# Patient Record
Sex: MALE | Race: WHITE | Employment: OTHER | URBAN - METROPOLITAN AREA
[De-identification: names, ages, dates, MRNs, and addresses within clinical notes are randomized per-mention and may not be internally consistent; named-entity substitution may affect disease eponyms.]

---

## 2019-07-29 ENCOUNTER — OFFICE VISIT (OUTPATIENT)
Dept: FAMILY MEDICINE CLINIC | Age: 34
End: 2019-07-29

## 2019-08-12 ENCOUNTER — HOSPITAL ENCOUNTER (OUTPATIENT)
Dept: GENERAL RADIOLOGY | Age: 34
Discharge: HOME | End: 2019-08-12
Payer: COMMERCIAL

## 2019-08-12 ENCOUNTER — OFFICE VISIT (OUTPATIENT)
Dept: FAMILY MEDICINE CLINIC | Age: 34
End: 2019-08-12

## 2019-08-12 VITALS
HEIGHT: 73 IN | SYSTOLIC BLOOD PRESSURE: 164 MMHG | RESPIRATION RATE: 20 BRPM | HEART RATE: 117 BPM | WEIGHT: 163 LBS | DIASTOLIC BLOOD PRESSURE: 94 MMHG | TEMPERATURE: 97.6 F | BODY MASS INDEX: 21.6 KG/M2

## 2019-08-12 DIAGNOSIS — R35.0 FREQUENCY OF URINATION: ICD-10-CM

## 2019-08-12 DIAGNOSIS — F41.9 ANXIETY: ICD-10-CM

## 2019-08-12 DIAGNOSIS — R05.9 COUGH: ICD-10-CM

## 2019-08-12 DIAGNOSIS — R00.2 HEART PALPITATIONS: ICD-10-CM

## 2019-08-12 DIAGNOSIS — Z13.29 SCREENING FOR THYROID DISORDER: ICD-10-CM

## 2019-08-12 DIAGNOSIS — I10 HYPERTENSION GOAL BP (BLOOD PRESSURE) < 130/80: ICD-10-CM

## 2019-08-12 DIAGNOSIS — E78.00 HYPERCHOLESTEROLEMIA: ICD-10-CM

## 2019-08-12 DIAGNOSIS — E55.9 VITAMIN D DEFICIENCY: ICD-10-CM

## 2019-08-12 DIAGNOSIS — R73.03 BORDERLINE DIABETES MELLITUS: ICD-10-CM

## 2019-08-12 DIAGNOSIS — Z76.89 ESTABLISHING CARE WITH NEW DOCTOR, ENCOUNTER FOR: Primary | ICD-10-CM

## 2019-08-12 PROCEDURE — 71046 X-RAY EXAM CHEST 2 VIEWS: CPT

## 2019-08-12 RX ORDER — METOPROLOL TARTRATE 25 MG/1
25 TABLET, FILM COATED ORAL 2 TIMES DAILY
Qty: 60 TAB | Refills: 0 | Status: SHIPPED | OUTPATIENT
Start: 2019-08-12 | End: 2019-09-12 | Stop reason: SDUPTHER
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
## 2019-08-12 NOTE — PATIENT INSTRUCTIONS
Metoprolol (Lopressor, Toprol XL) - (By mouth) Why this medicine is used:  
Treats high blood pressure, chest pain, and heart failure. Contact a nurse or doctor right away if you have: · Lightheadedness, dizziness, fainting · Rapid weight gain; swelling in your hands, ankles, or feet Common side effects: · Mild dizziness · Tiredness © 2017 [address] Information is for End User's use only and may not be sold, redistributed or otherwise used for commercial purposes.

## 2019-08-13 LAB
25(OH)D3+25(OH)D2 SERPL-MCNC: 36.2 NG/ML (ref 30–100)
ALBUMIN SERPL-MCNC: 4.6 G/DL (ref 3.5–5.5)
ALBUMIN/GLOB SERPL: 1.8 {RATIO} (ref 1.2–2.2)
ALP SERPL-CCNC: 60 IU/L (ref 39–117)
ALT SERPL-CCNC: 18 IU/L (ref 0–44)
APPEARANCE UR: CLEAR
AST SERPL-CCNC: 14 IU/L (ref 0–40)
BASOPHILS # BLD AUTO: 0 X10E3/UL (ref 0–0.2)
BASOPHILS NFR BLD AUTO: 1 %
BILIRUB SERPL-MCNC: 0.2 MG/DL (ref 0–1.2)
BILIRUB UR QL STRIP: NEGATIVE
BUN SERPL-MCNC: 15 MG/DL (ref 6–20)
BUN/CREAT SERPL: 16 (ref 9–20)
CALCIUM SERPL-MCNC: 9.4 MG/DL (ref 8.7–10.2)
CHLORIDE SERPL-SCNC: 103 MMOL/L (ref 96–106)
CHOLEST SERPL-MCNC: 187 MG/DL (ref 100–199)
CO2 SERPL-SCNC: 25 MMOL/L (ref 20–29)
COLOR UR: YELLOW
CREAT SERPL-MCNC: 0.93 MG/DL (ref 0.76–1.27)
EOSINOPHIL # BLD AUTO: 0.2 X10E3/UL (ref 0–0.4)
EOSINOPHIL NFR BLD AUTO: 4 %
ERYTHROCYTE [DISTWIDTH] IN BLOOD BY AUTOMATED COUNT: 14.2 % (ref 12.3–15.4)
EST. AVERAGE GLUCOSE BLD GHB EST-MCNC: 103 MG/DL
GLOBULIN SER CALC-MCNC: 2.6 G/DL (ref 1.5–4.5)
GLUCOSE SERPL-MCNC: 88 MG/DL (ref 65–99)
GLUCOSE UR QL: NEGATIVE
HBA1C MFR BLD: 5.2 % (ref 4.8–5.6)
HCT VFR BLD AUTO: 41.9 % (ref 37.5–51)
HDLC SERPL-MCNC: 41 MG/DL
HGB BLD-MCNC: 13.9 G/DL (ref 13–17.7)
HGB UR QL STRIP: NEGATIVE
IMM GRANULOCYTES # BLD AUTO: 0 X10E3/UL (ref 0–0.1)
IMM GRANULOCYTES NFR BLD AUTO: 0 %
KETONES UR QL STRIP: NEGATIVE
LDLC SERPL CALC-MCNC: 134 MG/DL (ref 0–99)
LEUKOCYTE ESTERASE UR QL STRIP: NEGATIVE
LYMPHOCYTES # BLD AUTO: 1.8 X10E3/UL (ref 0.7–3.1)
LYMPHOCYTES NFR BLD AUTO: 30 %
MCH RBC QN AUTO: 31.2 PG (ref 26.6–33)
MCHC RBC AUTO-ENTMCNC: 33.2 G/DL (ref 31.5–35.7)
MCV RBC AUTO: 94 FL (ref 79–97)
MICRO URNS: NORMAL
MONOCYTES # BLD AUTO: 0.4 X10E3/UL (ref 0.1–0.9)
MONOCYTES NFR BLD AUTO: 7 %
NEUTROPHILS # BLD AUTO: 3.5 X10E3/UL (ref 1.4–7)
NEUTROPHILS NFR BLD AUTO: 58 %
NITRITE UR QL STRIP: NEGATIVE
PH UR STRIP: 6 [PH] (ref 5–7.5)
PLATELET # BLD AUTO: 254 X10E3/UL (ref 150–450)
POTASSIUM SERPL-SCNC: 4.6 MMOL/L (ref 3.5–5.2)
PROT SERPL-MCNC: 7.2 G/DL (ref 6–8.5)
PROT UR QL STRIP: NEGATIVE
RBC # BLD AUTO: 4.45 X10E6/UL (ref 4.14–5.8)
SODIUM SERPL-SCNC: 141 MMOL/L (ref 134–144)
SP GR UR: 1.01 (ref 1–1.03)
TRIGL SERPL-MCNC: 60 MG/DL (ref 0–149)
TSH SERPL DL<=0.005 MIU/L-ACNC: 3.76 UIU/ML (ref 0.45–4.5)
UROBILINOGEN UR STRIP-MCNC: 0.2 MG/DL (ref 0.2–1)
VLDLC SERPL CALC-MCNC: 12 MG/DL (ref 5–40)
WBC # BLD AUTO: 5.9 X10E3/UL (ref 3.4–10.8)

## 2019-08-16 ENCOUNTER — TELEPHONE (OUTPATIENT)
Dept: FAMILY MEDICINE CLINIC | Age: 34
End: 2019-08-16

## 2019-08-16 NOTE — TELEPHONE ENCOUNTER
----- Message from Jeb Zapata sent at 8/16/2019  9:44 AM EDT -----  Regarding: Dr. Taisha Mckeon first and last name: Patient       Reason for call: Pt is needing a cardiac holter monitor and still hasn't received it after being told he would be given a call to set up a date for arrival. Please follow up with the pt in reference to this matter. Pt states that it was electronically order per paperwork.      Callback required yes/no and why:   yes     Best contact number(s): [phone]      Details to clarify the request:      Jeb Zapata

## 2019-08-19 ENCOUNTER — OFFICE VISIT (OUTPATIENT)
Dept: FAMILY MEDICINE CLINIC | Age: 34
End: 2019-08-19

## 2019-08-19 VITALS
HEART RATE: 61 BPM | OXYGEN SATURATION: 99 % | TEMPERATURE: 98.2 F | BODY MASS INDEX: 21.74 KG/M2 | DIASTOLIC BLOOD PRESSURE: 66 MMHG | SYSTOLIC BLOOD PRESSURE: 122 MMHG | HEIGHT: 73 IN | RESPIRATION RATE: 17 BRPM | WEIGHT: 164 LBS

## 2019-08-19 DIAGNOSIS — I10 HYPERTENSION, WELL CONTROLLED: ICD-10-CM

## 2019-08-19 DIAGNOSIS — N17.9 ACUTE RENAL FAILURE, UNSPECIFIED ACUTE RENAL FAILURE TYPE (HCC): ICD-10-CM

## 2019-08-19 DIAGNOSIS — G47.01 INSOMNIA DUE TO MEDICAL CONDITION: Primary | ICD-10-CM

## 2019-08-19 RX ORDER — LORAZEPAM 0.5 MG/1
TABLET ORAL
Qty: 30 TAB | Refills: 0 | Status: SHIPPED | OUTPATIENT
Start: 2019-08-19 | End: 2019-09-24 | Stop reason: SDUPTHER

## 2019-08-19 NOTE — PATIENT INSTRUCTIONS
DASH Diet: Care Instructions  Your Care Instructions    The DASH diet is an eating plan that can help lower your blood pressure. DASH stands for Dietary Approaches to Stop Hypertension. Hypertension is high blood pressure. The DASH diet focuses on eating foods that are high in calcium, potassium, and magnesium. These nutrients can lower blood pressure. The foods that are highest in these nutrients are fruits, vegetables, low-fat dairy products, nuts, seeds, and legumes. But taking calcium, potassium, and magnesium supplements instead of eating foods that are high in those nutrients does not have the same effect. The DASH diet also includes whole grains, fish, and poultry. The DASH diet is one of several lifestyle changes your doctor may recommend to lower your high blood pressure. Your doctor may also want you to decrease the amount of sodium in your diet. Lowering sodium while following the DASH diet can lower blood pressure even further than just the DASH diet alone. Follow-up care is a key part of your treatment and safety. Be sure to make and go to all appointments, and call your doctor if you are having problems. It's also a good idea to know your test results and keep a list of the medicines you take. How can you care for yourself at home? Following the DASH diet  · Eat 4 to 5 servings of fruit each day. A serving is 1 medium-sized piece of fruit, ½ cup chopped or canned fruit, 1/4 cup dried fruit, or 4 ounces (½ cup) of fruit juice. Choose fruit more often than fruit juice. · Eat 4 to 5 servings of vegetables each day. A serving is 1 cup of lettuce or raw leafy vegetables, ½ cup of chopped or cooked vegetables, or 4 ounces (½ cup) of vegetable juice. Choose vegetables more often than vegetable juice. · Get 2 to 3 servings of low-fat and fat-free dairy each day. A serving is 8 ounces of milk, 1 cup of yogurt, or 1 ½ ounces of cheese. · Eat 6 to 8 servings of grains each day.  A serving is 1 slice of bread, 1 ounce of dry cereal, or ½ cup of cooked rice, pasta, or cooked cereal. Try to choose whole-grain products as much as possible. · Limit lean meat, poultry, and fish to 2 servings each day. A serving is 3 ounces, about the size of a deck of cards. · Eat 4 to 5 servings of nuts, seeds, and legumes (cooked dried beans, lentils, and split peas) each week. A serving is 1/3 cup of nuts, 2 tablespoons of seeds, or ½ cup of cooked beans or peas. · Limit fats and oils to 2 to 3 servings each day. A serving is 1 teaspoon of vegetable oil or 2 tablespoons of salad dressing. · Limit sweets and added sugars to 5 servings or less a week. A serving is 1 tablespoon jelly or jam, ½ cup sorbet, or 1 cup of lemonade. · Eat less than 2,300 milligrams (mg) of sodium a day. If you limit your sodium to 1,500 mg a day, you can lower your blood pressure even more. Tips for success  · Start small. Do not try to make dramatic changes to your diet all at once. You might feel that you are missing out on your favorite foods and then be more likely to not follow the plan. Make small changes, and stick with them. Once those changes become habit, add a few more changes. · Try some of the following:  ? Make it a goal to eat a fruit or vegetable at every meal and at snacks. This will make it easy to get the recommended amount of fruits and vegetables each day. ? Try yogurt topped with fruit and nuts for a snack or healthy dessert. ? Add lettuce, tomato, cucumber, and onion to sandwiches. ? Combine a ready-made pizza crust with low-fat mozzarella cheese and lots of vegetable toppings. Try using tomatoes, squash, spinach, broccoli, carrots, cauliflower, and onions. ? Have a variety of cut-up vegetables with a low-fat dip as an appetizer instead of chips and dip. ? Sprinkle sunflower seeds or chopped almonds over salads. Or try adding chopped walnuts or almonds to cooked vegetables.   ? Try some vegetarian meals using beans and peas. Add garbanzo or kidney beans to salads. Make burritos and tacos with mashed ríos beans or black beans. Where can you learn more? Go to http://geoff-jen.info/. Enter U081 in the search box to learn more about \"DASH Diet: Care Instructions. \"  Current as of: July 22, 2018  Content Version: 12.1  © 5261-4567 [employer], [employer]. Care instructions adapted under license by [employer] (which disclaims liability or warranty for this information). If you have questions about a medical condition or this instruction, always ask your healthcare professional. [address] any warranty or liability for your use of this information.

## 2019-08-19 NOTE — PROGRESS NOTES
Call  regarding order for the cardiac monitor. Patient want to cancel reason have a scheduled appt with the cardio on 8/27/2019.

## 2019-08-19 NOTE — PROGRESS NOTES
Chief Complaint   Patient presents with    Labs     1 week follow up     HPI:  James Dickerson is a 28 y.o.  male with h/o episodic palpitation, hypertension presents for 1 week f/u on results . Patient was started on metoprolol a week ago. Today he says he feel much better; blood pressure is controlled, heart rate is within normal limits. He does have appointment with his cardiologist at [address] [place] [address] on 8/27/2019 so he declined use of heart monitor and echo. Concern today is difficulty falling asleep. He is requesting a sleep aid. Review of Systems  As per hpi    Past Medical History:   Diagnosis Date    Asthma     Chronic kidney disease     left kidny issiue    Hypercholesterolemia     Hypertension      Past Surgical History:   Procedure Laterality Date    ABDOMEN SURGERY PROC UNLISTED      exploratory    HX ORTHOPAEDIC      left hand    VASCULAR SURGERY PROCEDURE UNLIST      left leg     Social History     Socioeconomic History    Marital status: SINGLE     Spouse name: Not on file    Number of children: Not on file    Years of education: Not on file    Highest education level: Not on file   Tobacco Use    Smoking status: Current Every Day Smoker     Packs/day: 1.00    Smokeless tobacco: Never Used   Substance and Sexual Activity    Alcohol use: No    Drug use: Not Currently     Types: Marijuana    Sexual activity: Yes     Partners: Female     History reviewed. No pertinent family history. Current Outpatient Medications   Medication Sig Dispense Refill    metoprolol tartrate (LOPRESSOR) 25 mg tablet Take 1 Tab by mouth two (2) times a day.  60 Tab 0     Allergies   Allergen Reactions    Bactrim [Sulfamethoprim Ds] Rash    Nsaids (Non-Steroidal Anti-Inflammatory Drug) Other (comments)     Pt states he is not supposed to have due to kidney injury       Objective:  Visit Vitals  /66 (BP 1 Location: Left arm, BP Patient Position: Sitting)   Pulse 61   Temp 98.2 °F (36.8 °C) (Oral)   Resp 17   Ht 6' 1\" (1.854 m)   Wt 164 lb (74.4 kg)   SpO2 99%   BMI 21.64 kg/m²     Physical Exam:   General appearance - alert, well appearing in no distress  Mental status - alert, oriented to person, place, and time  Neck - supple, no significant adenopathy   Chest - clear to auscultation, no wheezes, rales or rhonchi  Heart - normal rate, regular rhythm, normal blood pressure  Abdomen - soft, nontender, nondistended, no organomegaly  Ext-peripheral pulses normal, no pedal edema  Neuro -alert, oriented, normal speech, no focal findings   Back-full range of motion, no tenderness, palpable spasm or pain on motion     Results for orders placed or performed in visit on 58/27/11   METABOLIC PANEL, COMPREHENSIVE   Result Value Ref Range    Glucose 88 65 - 99 mg/dL    BUN 15 6 - 20 mg/dL    Creatinine 0.93 0.76 - 1.27 mg/dL    GFR est non- >59 mL/min/1.73    GFR est  >59 mL/min/1.73    BUN/Creatinine ratio 16 9 - 20    Sodium 141 134 - 144 mmol/L    Potassium 4.6 3.5 - 5.2 mmol/L    Chloride 103 96 - 106 mmol/L    CO2 25 20 - 29 mmol/L    Calcium 9.4 8.7 - 10.2 mg/dL    Protein, total 7.2 6.0 - 8.5 g/dL    Albumin 4.6 3.5 - 5.5 g/dL    GLOBULIN, TOTAL 2.6 1.5 - 4.5 g/dL    A-G Ratio 1.8 1.2 - 2.2    Bilirubin, total 0.2 0.0 - 1.2 mg/dL    Alk. phosphatase 60 39 - 117 IU/L    AST (SGOT) 14 0 - 40 IU/L    ALT (SGPT) 18 0 - 44 IU/L   CBC WITH AUTOMATED DIFF   Result Value Ref Range    WBC 5.9 3.4 - 10.8 x10E3/uL    RBC 4.45 4.14 - 5.80 x10E6/uL    HGB 13.9 13.0 - 17.7 g/dL    HCT 41.9 37.5 - 51.0 %    MCV 94 79 - 97 fL    MCH 31.2 26.6 - 33.0 pg    MCHC 33.2 31.5 - 35.7 g/dL    RDW 14.2 12.3 - 15.4 %    PLATELET 564 209 - 572 x10E3/uL    NEUTROPHILS 58 Not Estab. %    Lymphocytes 30 Not Estab. %    MONOCYTES 7 Not Estab. %    EOSINOPHILS 4 Not Estab. %    BASOPHILS 1 Not Estab. %    ABS. NEUTROPHILS 3.5 1.4 - 7.0 x10E3/uL    Abs Lymphocytes 1.8 0.7 - 3.1 x10E3/uL    ABS. MONOCYTES 0.4 0.1 - 0.9 x10E3/uL    ABS. EOSINOPHILS 0.2 0.0 - 0.4 x10E3/uL    ABS. BASOPHILS 0.0 0.0 - 0.2 x10E3/uL    IMMATURE GRANULOCYTES 0 Not Estab. %    ABS. IMM. GRANS. 0.0 0.0 - 0.1 x10E3/uL   LIPID PANEL   Result Value Ref Range    Cholesterol, total 187 100 - 199 mg/dL    Triglyceride 60 0 - 149 mg/dL    HDL Cholesterol 41 >39 mg/dL    VLDL, calculated 12 5 - 40 mg/dL    LDL, calculated 134 (H) 0 - 99 mg/dL   HEMOGLOBIN A1C WITH EAG   Result Value Ref Range    Hemoglobin A1c 5.2 4.8 - 5.6 %    Estimated average glucose 103 mg/dL   TSH 3RD GENERATION   Result Value Ref Range    TSH 3.760 0.450 - 4.500 uIU/mL   VITAMIN D, 25 HYDROXY   Result Value Ref Range    VITAMIN D, 25-HYDROXY 36.2 30.0 - 100.0 ng/mL   URINALYSIS W/ RFLX MICROSCOPIC   Result Value Ref Range    Specific Gravity 1.015 1.005 - 1.030    pH (UA) 6.0 5.0 - 7.5    Color Yellow Yellow    Appearance Clear Clear    Leukocyte Esterase Negative Negative    Protein Negative Negative/Trace    Glucose Negative Negative    Ketone Negative Negative    Blood Negative Negative    Bilirubin Negative Negative    Urobilinogen 0.2 0.2 - 1.0 mg/dL    Nitrites Negative Negative    Microscopic Examination Comment      Assessment/Plan:  Diagnoses and all orders for this visit:    Insomnia due to medical condition  -     LORazepam (ATIVAN) 0.5 mg tablet; Take 1 tab nightly, Print, Disp-30 Tab, R-0    Acute renal failure, unspecified acute renal failure type ([address]  -      RETROPERITONEUM LTD; Future    Hypertension, well controlled      Patient Instructions        DASH Diet: Care Instructions  Your Care Instructions    The DASH diet is an eating plan that can help lower your blood pressure. DASH stands for Dietary Approaches to Stop Hypertension. Hypertension is high blood pressure. The DASH diet focuses on eating foods that are high in calcium, potassium, and magnesium. These nutrients can lower blood pressure.  The foods that are highest in these nutrients are fruits, vegetables, low-fat dairy products, nuts, seeds, and legumes. But taking calcium, potassium, and magnesium supplements instead of eating foods that are high in those nutrients does not have the same effect. The DASH diet also includes whole grains, fish, and poultry. The DASH diet is one of several lifestyle changes your doctor may recommend to lower your high blood pressure. Your doctor may also want you to decrease the amount of sodium in your diet. Lowering sodium while following the DASH diet can lower blood pressure even further than just the DASH diet alone. Follow-up care is a key part of your treatment and safety. Be sure to make and go to all appointments, and call your doctor if you are having problems. It's also a good idea to know your test results and keep a list of the medicines you take. How can you care for yourself at home? Following the DASH diet  · Eat 4 to 5 servings of fruit each day. A serving is 1 medium-sized piece of fruit, ½ cup chopped or canned fruit, 1/4 cup dried fruit, or 4 ounces (½ cup) of fruit juice. Choose fruit more often than fruit juice. · Eat 4 to 5 servings of vegetables each day. A serving is 1 cup of lettuce or raw leafy vegetables, ½ cup of chopped or cooked vegetables, or 4 ounces (½ cup) of vegetable juice. Choose vegetables more often than vegetable juice. · Get 2 to 3 servings of low-fat and fat-free dairy each day. A serving is 8 ounces of milk, 1 cup of yogurt, or 1 ½ ounces of cheese. · Eat 6 to 8 servings of grains each day. A serving is 1 slice of bread, 1 ounce of dry cereal, or ½ cup of cooked rice, pasta, or cooked cereal. Try to choose whole-grain products as much as possible. · Limit lean meat, poultry, and fish to 2 servings each day. A serving is 3 ounces, about the size of a deck of cards. · Eat 4 to 5 servings of nuts, seeds, and legumes (cooked dried beans, lentils, and split peas) each week.  A serving is 1/3 cup of nuts, 2 tablespoons of seeds, or ½ cup of cooked beans or peas. · Limit fats and oils to 2 to 3 servings each day. A serving is 1 teaspoon of vegetable oil or 2 tablespoons of salad dressing. · Limit sweets and added sugars to 5 servings or less a week. A serving is 1 tablespoon jelly or jam, ½ cup sorbet, or 1 cup of lemonade. · Eat less than 2,300 milligrams (mg) of sodium a day. If you limit your sodium to 1,500 mg a day, you can lower your blood pressure even more. Tips for success  · Start small. Do not try to make dramatic changes to your diet all at once. You might feel that you are missing out on your favorite foods and then be more likely to not follow the plan. Make small changes, and stick with them. Once those changes become habit, add a few more changes. · Try some of the following:  ? Make it a goal to eat a fruit or vegetable at every meal and at snacks. This will make it easy to get the recommended amount of fruits and vegetables each day. ? Try yogurt topped with fruit and nuts for a snack or healthy dessert. ? Add lettuce, tomato, cucumber, and onion to sandwiches. ? Combine a ready-made pizza crust with low-fat mozzarella cheese and lots of vegetable toppings. Try using tomatoes, squash, spinach, broccoli, carrots, cauliflower, and onions. ? Have a variety of cut-up vegetables with a low-fat dip as an appetizer instead of chips and dip. ? Sprinkle sunflower seeds or chopped almonds over salads. Or try adding chopped walnuts or almonds to cooked vegetables. ? Try some vegetarian meals using beans and peas. Add garbanzo or kidney beans to salads. Make burritos and tacos with mashed ríos beans or black beans. Where can you learn more? Go to http://geoff-jen.info/. Enter W606 in the search box to learn more about \"DASH Diet: Care Instructions. \"  Current as of: July 22, 2018  Content Version: 12.1  © 0721-9198 [employer].  Care instructions adapted under license by [employer] (which disclaims liability or warranty for this information). If you have questions about a medical condition or this instruction, always ask your healthcare professional. [place] [address] any warranty or liability for your use of this information. Follow-up and Dispositions    · Return in about 3 months (around 11/19/2019), or if symptoms worsen or fail to improve, for routine follow up.

## 2019-08-19 NOTE — PROGRESS NOTES
Chief Complaint   Patient presents with    Labs     1 week follow up     1. Have you been to the ER, urgent care clinic since your last visit? No   Hospitalized since your last visit? No     2. Have you seen or consulted any other health care providers outside of the [address] [place] [address] since your last visit?    No

## 2019-08-26 ENCOUNTER — HOSPITAL ENCOUNTER (OUTPATIENT)
Dept: ULTRASOUND IMAGING | Age: 34
Discharge: HOME | End: 2019-08-26
Attending: INTERNAL MEDICINE
Payer: COMMERCIAL

## 2019-08-26 DIAGNOSIS — N17.9 ACUTE RENAL FAILURE, UNSPECIFIED ACUTE RENAL FAILURE TYPE (HCC): ICD-10-CM

## 2019-08-26 PROCEDURE — 76775 US EXAM ABDO BACK WALL LIM: CPT

## 2019-09-09 NOTE — TELEPHONE ENCOUNTER
----- Message from Kyle Nguyen sent at 9/6/2019  5:16 PM EDT -----  Regarding: /Refill   Medication Refill    Caller (if not patient):Monique adam       Relationship of caller (if not patient):spouse      Best contact number(s):[phone]      Name of medication and dosage if known:Metroprolol 25mg       Is patient out of this medication (yes/no):  No- 4 days remaining       Pharmacy name:[facility]     Pharmacy listed in chart? (yes/no):Yes  Pharmacy phone number: [phone]      Details to clarify the request:      Kyle Nguyen

## 2019-09-11 ENCOUNTER — TELEPHONE (OUTPATIENT)
Dept: FAMILY MEDICINE CLINIC | Age: 34
End: 2019-09-11

## 2019-09-11 NOTE — TELEPHONE ENCOUNTER
Pt is checking on refill request for: metoprolol tartrate (LOPRESSOR) 25 mg tablet   He runs out today     Pending in [facility]       Best number to reach him is [phone]

## 2019-09-12 RX ORDER — METOPROLOL TARTRATE 25 MG/1
25 TABLET, FILM COATED ORAL 2 TIMES DAILY
Qty: 180 TAB | Refills: 1 | Status: SHIPPED | OUTPATIENT
Start: 2019-09-12 | End: 2020-08-12 | Stop reason: SDUPTHER

## 2019-09-14 RX ORDER — METOPROLOL TARTRATE 25 MG/1
25 TABLET, FILM COATED ORAL 2 TIMES DAILY
Qty: 60 TAB | Refills: 3 | Status: SHIPPED | OUTPATIENT
Start: 2019-09-14 | End: 2020-08-12

## 2019-09-24 DIAGNOSIS — G47.01 INSOMNIA DUE TO MEDICAL CONDITION: ICD-10-CM

## 2019-09-24 NOTE — TELEPHONE ENCOUNTER
----- Message from Anjali Waldron sent at 9/24/2019 12:39 PM EDT -----  Regarding: /telephone  Medication Refill    Caller (if not patient): Charley Barksdale       Relationship of caller (if not patient): spouse      Best contact number(s): [phone]      Name of medication and dosage if known: ativan 0.5 mg       Is patient out of this medication (yes/no): yes      Pharm[facility] name: [facility] listed in chart? (yes/no): yes  Pharmacy phone number: [phone]      Details to clarify the request:      Anjali Waldron

## 2019-09-25 RX ORDER — LORAZEPAM 0.5 MG/1
TABLET ORAL
Qty: 30 TAB | Refills: 0 | Status: SHIPPED | OUTPATIENT
Start: 2019-09-25 | End: 2019-10-24 | Stop reason: SDUPTHER

## 2019-10-24 DIAGNOSIS — G47.01 INSOMNIA DUE TO MEDICAL CONDITION: ICD-10-CM

## 2019-10-25 ENCOUNTER — DOCUMENTATION ONLY (OUTPATIENT)
Dept: FAMILY MEDICINE CLINIC | Age: 34
End: 2019-10-25

## 2019-10-25 RX ORDER — LORAZEPAM 0.5 MG/1
TABLET ORAL
Qty: 30 TAB | Refills: 0 | Status: SHIPPED | OUTPATIENT
Start: 2019-10-25 | End: 2019-12-11 | Stop reason: SDUPTHER

## 2019-12-11 DIAGNOSIS — G47.01 INSOMNIA DUE TO MEDICAL CONDITION: ICD-10-CM

## 2019-12-11 RX ORDER — LORAZEPAM 0.5 MG/1
TABLET ORAL
Qty: 30 TAB | Refills: 0 | Status: SHIPPED | OUTPATIENT
Start: 2019-12-11 | End: 2020-01-20

## 2020-01-18 DIAGNOSIS — G47.01 INSOMNIA DUE TO MEDICAL CONDITION: ICD-10-CM

## 2020-01-20 RX ORDER — LORAZEPAM 0.5 MG/1
TABLET ORAL
Qty: 30 TAB | Refills: 0 | Status: SHIPPED | OUTPATIENT
Start: 2020-01-20 | End: 2020-03-21

## 2020-03-20 DIAGNOSIS — G47.01 INSOMNIA DUE TO MEDICAL CONDITION: ICD-10-CM

## 2020-03-21 RX ORDER — LORAZEPAM 0.5 MG/1
TABLET ORAL
Qty: 30 TAB | Refills: 0 | Status: SHIPPED | OUTPATIENT
Start: 2020-03-21 | End: 2020-09-24 | Stop reason: ALTCHOICE

## 2020-09-24 ENCOUNTER — VIRTUAL VISIT (OUTPATIENT)
Dept: FAMILY MEDICINE CLINIC | Age: 35
End: 2020-09-24
Payer: COMMERCIAL

## 2020-09-24 DIAGNOSIS — F41.9 ANXIETY: ICD-10-CM

## 2020-09-24 DIAGNOSIS — G47.01 INSOMNIA DUE TO MEDICAL CONDITION: ICD-10-CM

## 2020-09-24 DIAGNOSIS — Z13.29 SCREENING FOR THYROID DISORDER: ICD-10-CM

## 2020-09-24 DIAGNOSIS — E55.9 VITAMIN D DEFICIENCY: ICD-10-CM

## 2020-09-24 DIAGNOSIS — I10 HYPERTENSION, WELL CONTROLLED: Primary | ICD-10-CM

## 2020-09-24 DIAGNOSIS — R73.03 BORDERLINE DIABETES MELLITUS: ICD-10-CM

## 2020-09-24 DIAGNOSIS — E78.00 HYPERCHOLESTEROLEMIA: ICD-10-CM

## 2020-09-24 DIAGNOSIS — R35.0 FREQUENCY OF URINATION: ICD-10-CM

## 2020-09-24 PROCEDURE — 99214 OFFICE O/P EST MOD 30 MIN: CPT | Performed by: INTERNAL MEDICINE

## 2020-09-24 RX ORDER — METOPROLOL TARTRATE 25 MG/1
TABLET, FILM COATED ORAL
Qty: 60 TAB | Refills: 3 | Status: SHIPPED | OUTPATIENT
Start: 2020-09-24 | End: 2021-02-08

## 2020-09-24 RX ORDER — ESCITALOPRAM OXALATE 5 MG/1
5 TABLET ORAL DAILY
Qty: 30 TAB | Refills: 2 | Status: SHIPPED | OUTPATIENT
Start: 2020-09-24 | End: 2021-12-01

## 2020-09-24 RX ORDER — LORAZEPAM 0.5 MG/1
TABLET ORAL
Qty: 30 TAB | Refills: 0 | Status: SHIPPED | OUTPATIENT
Start: 2020-09-24 | End: 2020-11-20

## 2020-09-24 NOTE — TELEPHONE ENCOUNTER
Pt states he does not want to start Lexapro     He wants to stay on current med Ativan - he will need a refill       Best number to reach him is [phone]

## 2020-11-19 DIAGNOSIS — G47.01 INSOMNIA DUE TO MEDICAL CONDITION: ICD-10-CM

## 2020-11-20 RX ORDER — LORAZEPAM 0.5 MG/1
TABLET ORAL
Qty: 30 TAB | Refills: 0 | Status: SHIPPED | OUTPATIENT
Start: 2020-11-20 | End: 2021-02-04 | Stop reason: SDUPTHER

## 2021-01-31 DIAGNOSIS — I10 HYPERTENSION, WELL CONTROLLED: ICD-10-CM

## 2021-02-04 DIAGNOSIS — G47.01 INSOMNIA DUE TO MEDICAL CONDITION: ICD-10-CM

## 2021-02-04 DIAGNOSIS — I10 HYPERTENSION, WELL CONTROLLED: ICD-10-CM

## 2021-02-04 RX ORDER — METOPROLOL TARTRATE 25 MG/1
TABLET, FILM COATED ORAL
Qty: 60 TAB | Refills: 3 | Status: CANCELLED | OUTPATIENT
Start: 2021-02-04

## 2021-02-08 RX ORDER — METOPROLOL TARTRATE 25 MG/1
TABLET, FILM COATED ORAL
Qty: 60 TAB | Refills: 3 | Status: SHIPPED | OUTPATIENT
Start: 2021-02-08 | End: 2021-07-05

## 2021-02-19 RX ORDER — LORAZEPAM 0.5 MG/1
TABLET ORAL
Qty: 30 TAB | Refills: 0 | Status: SHIPPED | OUTPATIENT
Start: 2021-02-19 | End: 2021-03-15

## 2021-03-15 DIAGNOSIS — G47.01 INSOMNIA DUE TO MEDICAL CONDITION: ICD-10-CM

## 2021-03-15 RX ORDER — LORAZEPAM 0.5 MG/1
TABLET ORAL
Qty: 30 TAB | Refills: 0 | Status: SHIPPED | OUTPATIENT
Start: 2021-03-15 | End: 2021-04-20 | Stop reason: SDUPTHER

## 2021-04-19 ENCOUNTER — TRANSCRIBE ORDER (OUTPATIENT)
Dept: INTERNAL MEDICINE CLINIC | Age: 36
End: 2021-04-19

## 2021-04-19 DIAGNOSIS — G47.01 INSOMNIA DUE TO MEDICAL CONDITION: ICD-10-CM

## 2021-04-19 NOTE — TELEPHONE ENCOUNTER
----- Message from Syeda Wayne sent at 4/19/2021  1:09 PM EDT -----  Regarding: Tash Bowers MD  Medication Refill    Caller (if not patient):      Relationship of caller (if not patient):      Best contact number(s): [phone]      Name of medication and dosage if known: LORazepam (ATIVAN) 0.5 mg tablet      Is patient out of this medication (yes/no): Yes      Pharmacy name: [facility] [place], [place] - [address]  [employer] [facility] Chris listed in chart? (yes/no):  Pharmacy phone number:      Details to clarify the request: pt is requesting temp fill until 04/23/2021 appt      Syeda Wayne

## 2021-04-21 RX ORDER — LORAZEPAM 0.5 MG/1
0.5 TABLET ORAL
Qty: 30 TAB | Refills: 0 | Status: SHIPPED | OUTPATIENT
Start: 2021-04-21 | End: 2021-04-23 | Stop reason: SDUPTHER

## 2021-04-23 ENCOUNTER — VIRTUAL VISIT (OUTPATIENT)
Dept: FAMILY MEDICINE CLINIC | Age: 36
End: 2021-04-23
Payer: COMMERCIAL

## 2021-04-23 DIAGNOSIS — F41.9 ANXIETY: ICD-10-CM

## 2021-04-23 DIAGNOSIS — G47.01 INSOMNIA DUE TO MEDICAL CONDITION: ICD-10-CM

## 2021-04-23 DIAGNOSIS — Z11.59 NEED FOR HEPATITIS C SCREENING TEST: Primary | ICD-10-CM

## 2021-04-23 PROCEDURE — 99214 OFFICE O/P EST MOD 30 MIN: CPT | Performed by: INTERNAL MEDICINE

## 2021-04-23 RX ORDER — LORAZEPAM 0.5 MG/1
TABLET ORAL
Qty: 30 TAB | Refills: 0 | Status: SHIPPED | OUTPATIENT
Start: 2021-04-23 | End: 2021-10-29

## 2021-04-23 NOTE — PROGRESS NOTES
Chief Complaint   Patient presents with    Medication Refill    Follow-up     HPI:  Gio Price is a 38 y.o. male who was seen by synchronous (real-time) audio-video technology on 4/23/2021 for Medication Refill and Follow-up    Assessment & Plan:   Diagnoses and all orders for this visit:    1. Need for hepatitis C screening test  -     HEPATITIS C AB; Future    2. Anxiety  -     LORazepam (ATIVAN) 0.5 mg tablet; Take 1 tab by mouth every night    3. Insomnia due to medical condition  -     LORazepam (ATIVAN) 0.5 mg tablet; Take 1 tab by mouth every night      I spent at least 20 minutes on this visit with this established patient. 712  Subjective:   Gio Price is a 38 y.o. AA male with h/o anxiety, episodic palpitation, hypertension, hypercholesterolemia, asthma is seen for follow up. Patient is here for medication refill. Since pat was seen September/2020, labs oreded have not been done. I have informed him that this will be my last giving him refill until blood work is done. Prior to Admission medications    Medication Sig Start Date End Date Taking? Authorizing Provider   LORazepam (ATIVAN) 0.5 mg tablet Take 1 Tab by mouth nightly as needed for Anxiety. Max Daily Amount: 0.5 mg. TAKE 1 TABLET BY MOUTH EVERY DAY EVERY NIGHTTAKE 1 TABLET BY MOUTH EVERY DAY EVERY NIGHT 4/21/21  Yes Ann العراقي MD   metoprolol tartrate (LOPRESSOR) 25 mg tablet TAKE 1 TABLET BY MOUTH TWICE A DAY 2/8/21  Yes Maegan SHIPLEY MD   escitalopram oxalate (LEXAPRO) 5 mg tablet Take 1 Tab by mouth daily. 9/24/20   Ann العراقي MD     There is no problem list on file for this patient. Current Outpatient Medications   Medication Sig Dispense Refill    LORazepam (ATIVAN) 0.5 mg tablet Take 1 Tab by mouth nightly as needed for Anxiety.  Max Daily Amount: 0.5 mg. TAKE 1 TABLET BY MOUTH EVERY DAY EVERY NIGHTTAKE 1 TABLET BY MOUTH EVERY DAY EVERY NIGHT 30 Tab 0    metoprolol tartrate (LOPRESSOR) 25 mg tablet TAKE 1 TABLET BY MOUTH TWICE A DAY 60 Tab 3    escitalopram oxalate (LEXAPRO) 5 mg tablet Take 1 Tab by mouth daily. 30 Tab 2     Allergies   Allergen Reactions    Bactrim [Sulfamethoprim Ds] Rash    Nsaids (Non-Steroidal Anti-Inflammatory Drug) Other (comments)     Pt states he is not supposed to have due to kidney injury     Past Medical History:   Diagnosis Date    Asthma     Chronic kidney disease     left connie aguilariaddy    Hypercholesterolemia     Hypertension      Past Surgical History:   Procedure Laterality Date    HX ORTHOPAEDIC      left hand    [place] ABDOMEN SURGERY PROC UNLISTED      exploratory    VASCULAR SURGERY PROCEDURE UNLIST      left leg     No family history on file. Social History     Tobacco Use    Smoking status: Current Every Day Smoker     Packs/day: 1.00    Smokeless tobacco: Never Used   Substance Use Topics    Alcohol use: No     ROS:  As per hpi    Objective:     Patient-Reported Vitals 4/21/2021   Patient-Reported Weight 180   Patient-Reported Height 6'1   Patient-Reported Pulse 61   Patient-Reported Temperature 98.4   Patient-Reported Systolic  523   Patient-Reported Diastolic 83      General: alert, cooperative, no distress   Mental  status: normal mood, behavior, speech, dress, motor activity, and thought processes, able to follow commands   HENT: NCAT   Neck: no visualized mass   Resp: no respiratory distress   Neuro: no gross deficits   Skin: no discoloration or lesions of concern on visible areas   Psychiatric: normal affect, consistent with stated mood, no evidence of hallucinations       Additional exam findings: We discussed the expected course, resolution and complications of the diagnosis(es) in detail. Medication risks, benefits, costs, interactions, and alternatives were discussed as indicated. I advised him to contact the office if his condition worsens, changes or fails to improve as anticipated. He expressed understanding with the diagnosis(es) and plan.      José Miguel Bennett, was evaluated through a synchronous (real-time) audio-video encounter. The patient (or guardian if applicable) is aware that this is a billable service. Verbal consent to proceed has been obtained within the past 12 months. The visit was conducted pursuant to the emergency declaration under the [address] [place] [address] authority and the [place] [employer] Act. Patient identification was verified, and a caregiver was present when appropriate. The patient was located in a state where the provider was credentialed to provide care.     Paulina Cabrales MD

## 2021-07-03 DIAGNOSIS — I10 HYPERTENSION, WELL CONTROLLED: ICD-10-CM

## 2021-07-05 RX ORDER — METOPROLOL TARTRATE 25 MG/1
TABLET, FILM COATED ORAL
Qty: 60 TABLET | Refills: 3 | Status: SHIPPED | OUTPATIENT
Start: 2021-07-05 | End: 2022-08-10 | Stop reason: SDUPTHER

## 2021-09-11 ENCOUNTER — HOSPITAL ENCOUNTER (EMERGENCY)
Age: 36
Discharge: HOME | End: 2021-09-11
Attending: STUDENT IN AN ORGANIZED HEALTH CARE EDUCATION/TRAINING PROGRAM
Payer: COMMERCIAL

## 2021-09-11 VITALS
RESPIRATION RATE: 16 BRPM | DIASTOLIC BLOOD PRESSURE: 99 MMHG | HEART RATE: 68 BPM | OXYGEN SATURATION: 96 % | SYSTOLIC BLOOD PRESSURE: 164 MMHG | TEMPERATURE: 98.7 F

## 2021-09-11 DIAGNOSIS — F41.1 ANXIETY STATE: Primary | ICD-10-CM

## 2021-09-11 PROCEDURE — 90791 PSYCH DIAGNOSTIC EVALUATION: CPT

## 2021-09-11 PROCEDURE — 99283 EMERGENCY DEPT VISIT LOW MDM: CPT

## 2021-09-11 NOTE — BSMART NOTE
Comprehensive Assessment Form Part 1      Section I - Disposition    Generalized Anxiety Disorder       The Medical Doctor to Psychiatrist conference was not completed. The Medical Doctor is in agreement with Psychiatrist disposition because of (reason) n/a. The plan is discharge patient home with referrals. The on-call Psychiatrist consulted was Dr. Lisabeth Gilford. The admitting Psychiatrist will be Dr. Lisabeth Gilford. The admitting Diagnosis is n/a. The Payor source is [address] [place] [address]    Section II - Integrated Summary  Summary: Per triage, \"Patient arrives ambulatory from home. Patient was brought in by [employer]. Patient states that he has been having trouble sleeping and was [place] a nervous breakdown tonight. \" Denies SI/HI\"     Patient is a 38year old male that arrived voluntary to the emergency room accompanied by [employer]. He was seen face to face by [facility]. Pt reported trouble sleeping and having a \"nervous breakdown. \" This writer attempted to ask patient the precipitating factors and he had a hard time identifying them. He reported \"stuff\" and \"life\" as triggers. He shared his insomnia began after the pandemic started, and pt has been dealing with work stressors and recently had a baby and this has been stressful. He described his breakdown as feeling \"anxious\" and \"sketchy. \" This writer asked pt to elaborate what he meant, and pt said he starts to \"question\" things and be skeptical. Denied SI/HI/AH/VH and reported no history of suicidal attempts or inpatient admissions. Pt does have a provider prescribing him medication, Dr. Rubén Stephens, and he takes Lorezapam for anxiety and insomnia. Pt reported his appetite to be \"so,so. \" He stated he recently began isolating and being more introverted. Pt lives with wife and 3year old baby.  Per pt, wife was concerned about patient's sleep and encouraged him to come to the hospital. This writer asked if she can call patient's wife and pt did not give consent. He currently has a misdemeanor and has a court date coming up. Patient appeared to be distracted during the assessment and demonstrated thought blocking. This writer asked questions multiple times before patient responded. Presented with anxious mood and flat affect. This patient does not meet criteria to be admitted inpatient, and has been provided with referrals for outpatient resources and the [facility] crisis number. Pt reported he is safe to go home. ED provider, Dr Jose Goodman is in agreement of this disposition. The patienthas demonstrated mental capacity to provide informed consent. The information is given by the patient. The Chief Complaint is insomnia and anxiety. The Precipitant Factors are life stressors. Previous Hospitalizations: No   The patient has not previously been in restraints. Current Psychiatrist and/or  is Dr. Kika Metz . Lethality Assessment:    The potential for suicide noted by the following: Pt denied SI. The potential for homicide is not noted. The patient has not been a perpetrator of sexual or physical abuse. There are pending charges and are listed as: misdemeanor . The patient is not felt to be at risk for self harm or harm to others. The attending nurse was advised n/a. Section III - Psychosocial  The patient's overall mood and attitude is anxious. Feelings of helplessness and hopelessness are not observed. Generalized anxiety is observed by verbal statements. Panic is not observed. Phobias are not observed. Obsessive compulsive tendencies are not observed. Section IV - Mental Status Exam  The patient's appearance is unkempt. The patient's behavior is guarded. The patient is oriented to time, place, person and situation. The patient's speech shows no evidence of impairment. The patient's mood is anxious. The range of affect is flat. The patient's thought content demonstrates paranoia. The thought process is blocking.   The patient's perception shows no evidence of impairment. The patient's memory shows no evidence of impairment. The patient's appetite shows no evidence of impairment. The patient's sleep has evidence of insomnia. The patient's insight shows no evidence of impairment. The patient's judgement shows no evidence of impairment. Section V - Substance Abuse  The patient is not using substances. Section VI - Living Arrangements  The patient is . The spouses approximate age is unknown and appears to be in good health. The patient lives with a spouse. The patient has one child age 3years old. The patient does plan to return home upon discharge. The patient does have legal issues pending. The patient's source of income comes from employment. Voodoo and cultural practices have not been voiced at this time. The patient's greatest support comes from family and this person will not be involved with the treatment. The patient has not been in an event described as horrible or outside the realm of ordinary life experience either currently or in the past.  The patient has not been a victim of sexual/physical abuse. Section VII - Other Areas of Clinical Concern  The highest grade achieved is unknown with the overall quality of school experience being described as unknown. The patient is currently employed and speaks [place] as a primary language. The patient has no communication impairments affecting communication. The patient's preference for learning can be described as: can read and write adequately.   The patient's hearing is normal.  The patient's vision is normal.      Varun Ashley, Supervisee in Social Work

## 2021-09-11 NOTE — ED TRIAGE NOTES
Patient called to triage bhat with no answer. Patient found outside. States he will be back in after he finishes his cigarette. Please fax copies of last 4 office visit notes and blood work within past 4-6 months to [address] [facility], Nephrology,  Fax [phone] attn Myra Varma or Daphne Land      Patient has pending evaluation as a new patient    Thank you

## 2021-09-11 NOTE — ED TRIAGE NOTES
Patient arrives ambulatory from home. Patient was brought in by [employer]. Patient states that he has been having trouble sleeping and was [place] a nervous breakdown tonight. \" Denies SI/HI.

## 2021-09-17 ENCOUNTER — HOSPITAL ENCOUNTER (EMERGENCY)
Age: 36
Discharge: LEFT BEFORE DISCHARGE | End: 2021-09-17
Attending: EMERGENCY MEDICINE
Payer: COMMERCIAL

## 2021-09-17 VITALS
OXYGEN SATURATION: 100 % | DIASTOLIC BLOOD PRESSURE: 67 MMHG | HEART RATE: 108 BPM | TEMPERATURE: 98.1 F | RESPIRATION RATE: 16 BRPM | SYSTOLIC BLOOD PRESSURE: 137 MMHG

## 2021-09-17 DIAGNOSIS — F19.10 SUBSTANCE ABUSE (HCC): ICD-10-CM

## 2021-09-17 DIAGNOSIS — F41.1 ANXIETY STATE: ICD-10-CM

## 2021-09-17 DIAGNOSIS — R44.3 HALLUCINATION: Primary | ICD-10-CM

## 2021-09-17 DIAGNOSIS — F17.200 TOBACCO DEPENDENCE: ICD-10-CM

## 2021-09-17 LAB
ALBUMIN SERPL-MCNC: 3.9 G/DL (ref 3.5–5)
ALBUMIN/GLOB SERPL: 1.1 {RATIO} (ref 1.1–2.2)
ALP SERPL-CCNC: 76 U/L (ref 45–117)
ALT SERPL-CCNC: 33 U/L (ref 12–78)
ANION GAP SERPL CALC-SCNC: 2 MMOL/L (ref 5–15)
APAP SERPL-MCNC: <2 UG/ML (ref 10–30)
APPEARANCE UR: CLEAR
AST SERPL-CCNC: 14 U/L (ref 15–37)
BACTERIA URNS QL MICRO: NEGATIVE /HPF
BASOPHILS # BLD: 0.1 K/UL (ref 0–0.1)
BASOPHILS NFR BLD: 1 % (ref 0–1)
BILIRUB SERPL-MCNC: 0.2 MG/DL (ref 0.2–1)
BILIRUB UR QL: NEGATIVE
BUN SERPL-MCNC: 15 MG/DL (ref 6–20)
BUN/CREAT SERPL: 15 (ref 12–20)
CALCIUM SERPL-MCNC: 9.1 MG/DL (ref 8.5–10.1)
CHLORIDE SERPL-SCNC: 107 MMOL/L (ref 97–108)
CO2 SERPL-SCNC: 29 MMOL/L (ref 21–32)
COLOR UR: NORMAL
CREAT SERPL-MCNC: 0.97 MG/DL (ref 0.7–1.3)
DIFFERENTIAL METHOD BLD: NORMAL
EOSINOPHIL # BLD: 0.4 K/UL (ref 0–0.4)
EOSINOPHIL NFR BLD: 4 % (ref 0–7)
EPITH CASTS URNS QL MICRO: NORMAL /LPF
ERYTHROCYTE [DISTWIDTH] IN BLOOD BY AUTOMATED COUNT: 14.5 % (ref 11.5–14.5)
ETHANOL SERPL-MCNC: <10 MG/DL
GLOBULIN SER CALC-MCNC: 3.7 G/DL (ref 2–4)
GLUCOSE SERPL-MCNC: 94 MG/DL (ref 65–100)
GLUCOSE UR STRIP.AUTO-MCNC: NEGATIVE MG/DL
HCT VFR BLD AUTO: 40.2 % (ref 36.6–50.3)
HGB BLD-MCNC: 13.4 G/DL (ref 12.1–17)
HGB UR QL STRIP: NEGATIVE
HYALINE CASTS URNS QL MICRO: NORMAL /LPF (ref 0–5)
IMM GRANULOCYTES # BLD AUTO: 0 K/UL (ref 0–0.04)
IMM GRANULOCYTES NFR BLD AUTO: 0 % (ref 0–0.5)
KETONES UR QL STRIP.AUTO: NEGATIVE MG/DL
LEUKOCYTE ESTERASE UR QL STRIP.AUTO: NEGATIVE
LYMPHOCYTES # BLD: 2.5 K/UL (ref 0.8–3.5)
LYMPHOCYTES NFR BLD: 27 % (ref 12–49)
MCH RBC QN AUTO: 31.2 PG (ref 26–34)
MCHC RBC AUTO-ENTMCNC: 33.3 G/DL (ref 30–36.5)
MCV RBC AUTO: 93.5 FL (ref 80–99)
MONOCYTES # BLD: 0.8 K/UL (ref 0–1)
MONOCYTES NFR BLD: 9 % (ref 5–13)
NEUTS SEG # BLD: 5.5 K/UL (ref 1.8–8)
NEUTS SEG NFR BLD: 59 % (ref 32–75)
NITRITE UR QL STRIP.AUTO: NEGATIVE
NRBC # BLD: 0 K/UL (ref 0–0.01)
NRBC BLD-RTO: 0 PER 100 WBC
PH UR STRIP: 6 [PH] (ref 5–8)
PLATELET # BLD AUTO: 309 K/UL (ref 150–400)
PMV BLD AUTO: 10.5 FL (ref 8.9–12.9)
POTASSIUM SERPL-SCNC: 3.7 MMOL/L (ref 3.5–5.1)
PROT SERPL-MCNC: 7.6 G/DL (ref 6.4–8.2)
PROT UR STRIP-MCNC: NEGATIVE MG/DL
RBC # BLD AUTO: 4.3 M/UL (ref 4.1–5.7)
RBC #/AREA URNS HPF: NORMAL /HPF (ref 0–5)
SALICYLATES SERPL-MCNC: 4.2 MG/DL (ref 2.8–20)
SODIUM SERPL-SCNC: 138 MMOL/L (ref 136–145)
SP GR UR REFRACTOMETRY: 1.02 (ref 1–1.03)
TSH SERPL DL<=0.05 MIU/L-ACNC: 4.84 UIU/ML (ref 0.36–3.74)
UR CULT HOLD, URHOLD: NORMAL
UROBILINOGEN UR QL STRIP.AUTO: 0.2 EU/DL (ref 0.2–1)
WBC # BLD AUTO: 9.3 K/UL (ref 4.1–11.1)
WBC URNS QL MICRO: NORMAL /HPF (ref 0–4)

## 2021-09-17 PROCEDURE — 85025 COMPLETE CBC W/AUTO DIFF WBC: CPT

## 2021-09-17 PROCEDURE — 80143 DRUG ASSAY ACETAMINOPHEN: CPT

## 2021-09-17 PROCEDURE — 84443 ASSAY THYROID STIM HORMONE: CPT

## 2021-09-17 PROCEDURE — 82077 ASSAY SPEC XCP UR&BREATH IA: CPT

## 2021-09-17 PROCEDURE — 81001 URINALYSIS AUTO W/SCOPE: CPT

## 2021-09-17 PROCEDURE — 90791 PSYCH DIAGNOSTIC EVALUATION: CPT

## 2021-09-17 PROCEDURE — 80179 DRUG ASSAY SALICYLATE: CPT

## 2021-09-17 PROCEDURE — 36415 COLL VENOUS BLD VENIPUNCTURE: CPT

## 2021-09-17 PROCEDURE — 99281 EMR DPT VST MAYX REQ PHY/QHP: CPT

## 2021-09-17 PROCEDURE — 80307 DRUG TEST PRSMV CHEM ANLYZR: CPT

## 2021-09-17 PROCEDURE — 80053 COMPREHEN METABOLIC PANEL: CPT

## 2021-09-17 RX ORDER — IBUPROFEN 200 MG
1 TABLET ORAL
Status: DISCONTINUED | OUTPATIENT
Start: 2021-09-17 | End: 2021-09-18 | Stop reason: HOSPADM

## 2021-09-17 NOTE — ED PROVIDER NOTES
Patient is a 34-year-old male presented emergency department for psych evaluation. Patient was brought in by Silvia Lam after he states \"I had a nervous breakdown, lost tonight\". Patient denies SI or HI denies any auditory or visual hallucinations patient says that he has been having difficulty sleeping. Past Medical History:   Diagnosis Date    Asthma     Chronic kidney disease     left kidny issiue    Hypercholesterolemia     Hypertension        Past Surgical History:   Procedure Laterality Date    HX ORTHOPAEDIC      left hand    [place] ABDOMEN SURGERY PROC UNLISTED      exploratory    VASCULAR SURGERY PROCEDURE UNLIST      left leg         History reviewed. No pertinent family history. Social History     Socioeconomic History    Marital status: SINGLE     Spouse name: Not on file    Number of children: Not on file    Years of education: Not on file    Highest education level: Not on file   Occupational History    Not on file   Tobacco Use    Smoking status: Current Every Day Smoker     Packs/day: 1.00    Smokeless tobacco: Never Used   Substance and Sexual Activity    Alcohol use: No    Drug use: Not Currently     Types: Marijuana    Sexual activity: Yes     Partners: Female   Other Topics Concern    Not on file   Social History Narrative    Not on file     Social Determinants of Health     Financial Resource Strain:     Difficulty of Paying Living Expenses:    Food Insecurity:     Worried About Running Out of Food in the Last Year:     [address] in the Last Year:    Transportation Needs:     Lack of Transportation (Medical):      Lack of Transportation (Non-Medical):    Physical Activity:     Days of Exercise per Week:     Minutes of Exercise per Session:    Stress:     Feeling of Stress :    Social Connections:     Frequency of Communication with Friends and Family:     Frequency of Social Gatherings with Friends and Family:     Attends [employer] Services:     Active Member of Clubs or Organizations:     Attends Club or Organization Meetings:     Marital Status:    Intimate Partner Violence:     Fear of Current or Ex-Partner:     Emotionally Abused:     Physically Abused:     Sexually Abused: ALLERGIES: Bactrim [sulfamethoprim ds] and Nsaids (non-steroidal anti-inflammatory drug)    Review of Systems   Psychiatric/Behavioral: Positive for decreased concentration and sleep disturbance. The patient is nervous/anxious. All other systems reviewed and are negative. Vitals:    09/11/21 0513   BP: (!) 164/99   Pulse: 68   Resp: 16   Temp: 98.7 °F (37.1 °C)   SpO2: 96%            Physical Exam  Vitals and nursing note reviewed. Constitutional:       Appearance: Normal appearance. HENT:      Head: Normocephalic and atraumatic. Eyes:      Extraocular Movements: Extraocular movements intact. Pupils: Pupils are equal, round, and reactive to light. Cardiovascular:      Rate and Rhythm: Normal rate and regular rhythm. Pulses: Normal pulses. Heart sounds: Normal heart sounds. Pulmonary:      Effort: Pulmonary effort is normal.      Breath sounds: Normal breath sounds. Abdominal:      General: Abdomen is flat. Palpations: Abdomen is soft. Musculoskeletal:         General: Normal range of motion. Cervical back: Normal range of motion and neck supple. Skin:     General: Skin is warm and dry. Neurological:      General: No focal deficit present. Mental Status: He is alert and oriented to person, place, and time. Psychiatric:         Attention and Perception: Attention and perception normal.         Mood and Affect: Mood is anxious. Behavior: Behavior normal.          MDM  Number of Diagnoses or Management Options  Anxiety state  Diagnosis management comments: A/P: Anxiety attack, panic attack, stress response. 48-year-old male presented emergency department with likely panic attack, anxiety. Patient has been seen medically evaluated no indication for labs or admission patient to be discharged with outpatient resources.          Procedures
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
## 2021-09-17 NOTE — ED TRIAGE NOTES
Triage: Pt arrives ambulatory from home accompanied by his fiance who reports pt has been responding to internal stimuli. He has not had previous mental health admissions. He denies SI/HI. His fiance reports he has been trying to start fights \"just to feel something\".

## 2021-09-18 LAB
AMPHET UR QL SCN: POSITIVE
BARBITURATES UR QL SCN: NEGATIVE
BENZODIAZ UR QL: NEGATIVE
CANNABINOIDS UR QL SCN: NEGATIVE
COCAINE UR QL SCN: NEGATIVE
DRUG SCRN COMMENT,DRGCM: ABNORMAL
METHADONE UR QL: NEGATIVE
OPIATES UR QL: POSITIVE
PCP UR QL: NEGATIVE

## 2021-09-18 NOTE — ED NOTES
Pt not found in waiting room. Checked the immediate vicinity outside the waiting room and patient was not found their either.
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
## 2021-10-26 ENCOUNTER — HOSPITAL ENCOUNTER (EMERGENCY)
Age: 36
Discharge: LEFT BEFORE DISCHARGE | End: 2021-10-27
Attending: EMERGENCY MEDICINE
Payer: COMMERCIAL

## 2021-10-26 DIAGNOSIS — G47.01 INSOMNIA DUE TO MEDICAL CONDITION: ICD-10-CM

## 2021-10-26 DIAGNOSIS — F41.9 ANXIETY: ICD-10-CM

## 2021-10-26 DIAGNOSIS — R45.851 SUICIDAL IDEATION: Primary | ICD-10-CM

## 2021-10-26 LAB
ALBUMIN SERPL-MCNC: 4.1 G/DL (ref 3.5–5)
ALBUMIN/GLOB SERPL: 1.2 {RATIO} (ref 1.1–2.2)
ALP SERPL-CCNC: 78 U/L (ref 45–117)
ALT SERPL-CCNC: 31 U/L (ref 12–78)
ANION GAP SERPL CALC-SCNC: 4 MMOL/L (ref 5–15)
APAP SERPL-MCNC: <2 UG/ML (ref 10–30)
AST SERPL-CCNC: 13 U/L (ref 15–37)
BASOPHILS # BLD: 0.1 K/UL (ref 0–0.1)
BASOPHILS NFR BLD: 1 % (ref 0–1)
BILIRUB SERPL-MCNC: 0.4 MG/DL (ref 0.2–1)
BUN SERPL-MCNC: 12 MG/DL (ref 6–20)
BUN/CREAT SERPL: 13 (ref 12–20)
CALCIUM SERPL-MCNC: 9.4 MG/DL (ref 8.5–10.1)
CHLORIDE SERPL-SCNC: 106 MMOL/L (ref 97–108)
CO2 SERPL-SCNC: 28 MMOL/L (ref 21–32)
COMMENT, HOLDF: NORMAL
CREAT SERPL-MCNC: 0.91 MG/DL (ref 0.7–1.3)
DIFFERENTIAL METHOD BLD: NORMAL
EOSINOPHIL # BLD: 0.1 K/UL (ref 0–0.4)
EOSINOPHIL NFR BLD: 1 % (ref 0–7)
ERYTHROCYTE [DISTWIDTH] IN BLOOD BY AUTOMATED COUNT: 13.4 % (ref 11.5–14.5)
ETHANOL SERPL-MCNC: <10 MG/DL
GLOBULIN SER CALC-MCNC: 3.5 G/DL (ref 2–4)
GLUCOSE SERPL-MCNC: 94 MG/DL (ref 65–100)
HCT VFR BLD AUTO: 40.9 % (ref 36.6–50.3)
HGB BLD-MCNC: 13.5 G/DL (ref 12.1–17)
IMM GRANULOCYTES # BLD AUTO: 0 K/UL (ref 0–0.04)
IMM GRANULOCYTES NFR BLD AUTO: 0 % (ref 0–0.5)
LYMPHOCYTES # BLD: 2.1 K/UL (ref 0.8–3.5)
LYMPHOCYTES NFR BLD: 21 % (ref 12–49)
MCH RBC QN AUTO: 30.9 PG (ref 26–34)
MCHC RBC AUTO-ENTMCNC: 33 G/DL (ref 30–36.5)
MCV RBC AUTO: 93.6 FL (ref 80–99)
MONOCYTES # BLD: 1 K/UL (ref 0–1)
MONOCYTES NFR BLD: 10 % (ref 5–13)
NEUTS SEG # BLD: 6.6 K/UL (ref 1.8–8)
NEUTS SEG NFR BLD: 67 % (ref 32–75)
NRBC # BLD: 0 K/UL (ref 0–0.01)
NRBC BLD-RTO: 0 PER 100 WBC
PLATELET # BLD AUTO: 264 K/UL (ref 150–400)
PMV BLD AUTO: 10.5 FL (ref 8.9–12.9)
POTASSIUM SERPL-SCNC: 3.7 MMOL/L (ref 3.5–5.1)
PROT SERPL-MCNC: 7.6 G/DL (ref 6.4–8.2)
RBC # BLD AUTO: 4.37 M/UL (ref 4.1–5.7)
SALICYLATES SERPL-MCNC: 2.6 MG/DL (ref 2.8–20)
SAMPLES BEING HELD,HOLD: NORMAL
SODIUM SERPL-SCNC: 138 MMOL/L (ref 136–145)
WBC # BLD AUTO: 9.8 K/UL (ref 4.1–11.1)

## 2021-10-26 PROCEDURE — 87636 SARSCOV2 & INF A&B AMP PRB: CPT

## 2021-10-26 PROCEDURE — 36415 COLL VENOUS BLD VENIPUNCTURE: CPT

## 2021-10-26 PROCEDURE — 80143 DRUG ASSAY ACETAMINOPHEN: CPT

## 2021-10-26 PROCEDURE — 99284 EMERGENCY DEPT VISIT MOD MDM: CPT

## 2021-10-26 PROCEDURE — 85025 COMPLETE CBC W/AUTO DIFF WBC: CPT

## 2021-10-26 PROCEDURE — 80053 COMPREHEN METABOLIC PANEL: CPT

## 2021-10-26 PROCEDURE — 80179 DRUG ASSAY SALICYLATE: CPT

## 2021-10-26 PROCEDURE — 82077 ASSAY SPEC XCP UR&BREATH IA: CPT

## 2021-10-26 PROCEDURE — 99283 EMERGENCY DEPT VISIT LOW MDM: CPT

## 2021-10-26 NOTE — ED TRIAGE NOTES
Pt arrives with mother c/o SI with no plan. Pt also endorses auditory hallucinations. Pt requires constant redirection in triage. Pt states he last used meth yesterday.

## 2021-10-27 VITALS
TEMPERATURE: 98.2 F | RESPIRATION RATE: 18 BRPM | SYSTOLIC BLOOD PRESSURE: 146 MMHG | OXYGEN SATURATION: 100 % | HEART RATE: 64 BPM | DIASTOLIC BLOOD PRESSURE: 90 MMHG

## 2021-10-27 LAB
AMORPH CRY URNS QL MICRO: ABNORMAL
AMPHET UR QL SCN: POSITIVE
APPEARANCE UR: ABNORMAL
BACTERIA URNS QL MICRO: NEGATIVE /HPF
BARBITURATES UR QL SCN: NEGATIVE
BENZODIAZ UR QL: NEGATIVE
BILIRUB UR QL: NEGATIVE
CANNABINOIDS UR QL SCN: NEGATIVE
COCAINE UR QL SCN: NEGATIVE
COLOR UR: ABNORMAL
DRUG SCRN COMMENT,DRGCM: ABNORMAL
EPITH CASTS URNS QL MICRO: ABNORMAL /LPF
FLUAV RNA SPEC QL NAA+PROBE: NOT DETECTED
FLUBV RNA SPEC QL NAA+PROBE: NOT DETECTED
GLUCOSE UR STRIP.AUTO-MCNC: NEGATIVE MG/DL
HGB UR QL STRIP: NEGATIVE
KETONES UR QL STRIP.AUTO: NEGATIVE MG/DL
LEUKOCYTE ESTERASE UR QL STRIP.AUTO: NEGATIVE
METHADONE UR QL: NEGATIVE
NITRITE UR QL STRIP.AUTO: NEGATIVE
OPIATES UR QL: NEGATIVE
PCP UR QL: NEGATIVE
PH UR STRIP: 6.5 [PH] (ref 5–8)
PROT UR STRIP-MCNC: NEGATIVE MG/DL
RBC #/AREA URNS HPF: ABNORMAL /HPF (ref 0–5)
SARS-COV-2, COV2: NOT DETECTED
SP GR UR REFRACTOMETRY: 1.02 (ref 1–1.03)
UR CULT HOLD, URHOLD: NORMAL
UROBILINOGEN UR QL STRIP.AUTO: 0.2 EU/DL (ref 0.2–1)
WBC URNS QL MICRO: ABNORMAL /HPF (ref 0–4)

## 2021-10-27 PROCEDURE — 81001 URINALYSIS AUTO W/SCOPE: CPT

## 2021-10-27 PROCEDURE — 74011250637 HC RX REV CODE- 250/637: Performed by: EMERGENCY MEDICINE

## 2021-10-27 PROCEDURE — 80307 DRUG TEST PRSMV CHEM ANLYZR: CPT

## 2021-10-27 RX ORDER — IBUPROFEN 200 MG
1 TABLET ORAL
Status: DISCONTINUED | OUTPATIENT
Start: 2021-10-27 | End: 2021-10-27 | Stop reason: HOSPADM

## 2021-10-27 NOTE — BSMART NOTE
Spoke with Lo Lucas at THE [facility] who stated they would not be assessing patient for TDO at this time. Lo Lucas stated she is not seeing safety concerns and feels this is substance-related.
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
## 2021-10-27 NOTE — ED NOTES
Patient abruptly approached nursing station, reached over the counter and grabbed belongings. Patient then proceeded to elope while yelling \"I will not stay here\".  Patient did not meet TDO requirements per [facility]

## 2021-10-27 NOTE — ED PROVIDER NOTES
This is a 26-year-old male who has a history of some asthma and hypertension as well as elevated cholesterol. There is some issue with his left kidney. He does use methamphetamine and last use yesterday. He is here with his mother expressing suicidal ideation without any plan. He states that he does not drink. He smokes occasionally but does not smoke weed. He has a history of the asthma and hypertension for which he takes medications. He has been taking those as he should over the last several weeks. He also has some type of sleep disorder which she has taken lorazepam for in the past.  He denies any other acute symptomatology. Past Medical History:   Diagnosis Date    Asthma     Chronic kidney disease     left connie aguilariue    Hypercholesterolemia     Hypertension        Past Surgical History:   Procedure Laterality Date    HX ORTHOPAEDIC      left hand    DC ABDOMEN SURGERY PROC UNLISTED      exploratory    VASCULAR SURGERY PROCEDURE UNLIST      left leg         History reviewed. No pertinent family history. Social History     Socioeconomic History    Marital status: SINGLE     Spouse name: Not on file    Number of children: Not on file    Years of education: Not on file    Highest education level: Not on file   Occupational History    Not on file   Tobacco Use    Smoking status: Current Every Day Smoker     Packs/day: 1.00    Smokeless tobacco: Never Used   Substance and Sexual Activity    Alcohol use: No    Drug use: Not Currently     Types: Marijuana    Sexual activity: Yes     Partners: Female   Other Topics Concern    Not on file   Social History Narrative    Not on file     Social Determinants of Health     Financial Resource Strain:     Difficulty of Paying Living Expenses:    Food Insecurity:     Worried About Running Out of Food in the Last Year:     [address] in the Last Year:    Transportation Needs:     Lack of Transportation (Medical):      Lack of Transportation (Non-Medical):    Physical Activity:     Days of Exercise per Week:     Minutes of Exercise per Session:    Stress:     Feeling of Stress :    Social Connections:     Frequency of Communication with Friends and Family:     Frequency of Social Gatherings with Friends and Family:     Attends [employer] Services:     Active Member of Clubs or Organizations:     Attends Club or Organization Meetings:     Marital Status:    Intimate Partner Violence:     Fear of Current or Ex-Partner:     Emotionally Abused:     Physically Abused:     Sexually Abused: ALLERGIES: Bactrim [sulfamethoprim ds] and Nsaids (non-steroidal anti-inflammatory drug)    Review of Systems   Constitutional: Negative for activity change, appetite change and fatigue. HENT: Negative for ear pain, facial swelling, sore throat and trouble swallowing. Eyes: Negative for pain, discharge and visual disturbance. Respiratory: Negative for chest tightness, shortness of breath and wheezing. Cardiovascular: Negative for chest pain and palpitations. Gastrointestinal: Negative for abdominal pain, blood in stool, nausea and vomiting. Genitourinary: Negative for difficulty urinating, flank pain and hematuria. Musculoskeletal: Negative for arthralgias, joint swelling, myalgias and neck pain. Skin: Negative for color change and rash. Neurological: Negative for dizziness, weakness, numbness and headaches. Hematological: Negative for adenopathy. Does not bruise/bleed easily. Psychiatric/Behavioral: Positive for hallucinations, sleep disturbance and suicidal ideas. Negative for behavioral problems and confusion. All other systems reviewed and are negative. Vitals:    10/26/21 1947 10/26/21 2120   BP: (!) 175/99    Pulse: 89    Resp: 16    Temp: 98.1 °F (36.7 °C)    SpO2: 99% 99%            Physical Exam  Vitals and nursing note reviewed. Constitutional:       General: He is not in acute distress. Appearance: He is well-developed. He is not ill-appearing. HENT:      Head: Normocephalic and atraumatic. Nose: Nose normal.   Eyes:      General: No scleral icterus. Conjunctiva/sclera: Conjunctivae normal.      Pupils: Pupils are equal, round, and reactive to light. Neck:      Thyroid: No thyromegaly. Vascular: No JVD. Trachea: No tracheal deviation. Comments: No carotid bruits noted. Cardiovascular:      Rate and Rhythm: Normal rate and regular rhythm. Heart sounds: Normal heart sounds. No murmur heard. No friction rub. No gallop. Pulmonary:      Effort: Pulmonary effort is normal. No respiratory distress. Breath sounds: Normal breath sounds. No wheezing or rales. Chest:      Chest wall: No tenderness. Abdominal:      General: Bowel sounds are normal. There is no distension. Palpations: Abdomen is soft. There is no mass. Tenderness: There is no abdominal tenderness. There is no guarding or rebound. Musculoskeletal:         General: No tenderness. Normal range of motion. Cervical back: Normal range of motion and neck supple. Lymphadenopathy:      Cervical: No cervical adenopathy. Skin:     General: Skin is warm and dry. Findings: No erythema or rash. Neurological:      Mental Status: He is alert and oriented to person, place, and time. Cranial Nerves: No cranial nerve deficit. Coordination: Coordination normal.      Deep Tendon Reflexes: Reflexes are normal and symmetric. Psychiatric:      Comments: Patient was a little distracted. He does not focus well.           MDM  Number of Diagnoses or Management Options     Amount and/or Complexity of Data Reviewed  Clinical lab tests: reviewed and ordered  Decide to obtain previous medical records or to obtain history from someone other than the patient: yes  Review and summarize past medical records: yes  Discuss the patient with other providers: yes    Risk of Complications, Morbidity, and/or Mortality  Presenting problems: high  Diagnostic procedures: high  Management options: high    Patient Progress  Patient progress: stable    ED Course as of Oct 27 0329   Wed Oct 27, 2021   0103 Patient seen by be smart. Plan is to contact him like a crisis for possible TDO as the patient is denying all complaints at this time and is not agreeable to admission. No indication for ECF at this time if the patient decides it is not 1 to stay to wait for evaluation by [facility].    [JM]      ED Course User Index  [JM] Shama Candelaria MD       Procedures    Patient's labs are currently pending. He is awaiting medical clearance. Be smart should be seeing him here shortly. Patient is cooperative and comfortable. The patient is medically clear for admission. The counselor is not spoken to me about this patient as yet. 3:29 AM  Patient was evaluated by boris dimas who felt the patient should be admitted however he was not agreeable to that plan. She contacted and regular crisis who refused to evaluate the patient stating head CT did not meet her criteria. On further discussion, the patient does seem agreeable at this time psychiatric admission. He has been medically cleared.

## 2021-10-29 RX ORDER — LORAZEPAM 0.5 MG/1
TABLET ORAL
Qty: 30 TABLET | Refills: 0 | OUTPATIENT
Start: 2021-10-29 | End: 2021-11-20

## 2021-11-20 ENCOUNTER — APPOINTMENT (OUTPATIENT)
Dept: CT IMAGING | Age: 36
End: 2021-11-20
Attending: PHYSICIAN ASSISTANT
Payer: COMMERCIAL

## 2021-11-20 ENCOUNTER — HOSPITAL ENCOUNTER (EMERGENCY)
Age: 36
Discharge: HOME | End: 2021-11-20
Attending: EMERGENCY MEDICINE
Payer: COMMERCIAL

## 2021-11-20 ENCOUNTER — APPOINTMENT (OUTPATIENT)
Dept: GENERAL RADIOLOGY | Age: 36
End: 2021-11-20
Attending: PHYSICIAN ASSISTANT
Payer: COMMERCIAL

## 2021-11-20 VITALS
DIASTOLIC BLOOD PRESSURE: 77 MMHG | BODY MASS INDEX: 20.98 KG/M2 | TEMPERATURE: 97.9 F | SYSTOLIC BLOOD PRESSURE: 141 MMHG | WEIGHT: 158.29 LBS | RESPIRATION RATE: 20 BRPM | OXYGEN SATURATION: 100 % | HEIGHT: 73 IN | HEART RATE: 69 BPM

## 2021-11-20 DIAGNOSIS — R04.0 EPISTAXIS: ICD-10-CM

## 2021-11-20 DIAGNOSIS — S01.81XA LACERATION OF MULTIPLE SITES OF FACE: ICD-10-CM

## 2021-11-20 DIAGNOSIS — S02.2XXA CLOSED FRACTURE OF NASAL BONE, INITIAL ENCOUNTER: Primary | ICD-10-CM

## 2021-11-20 DIAGNOSIS — F17.200 TOBACCO DEPENDENCE: ICD-10-CM

## 2021-11-20 PROCEDURE — 74011250637 HC RX REV CODE- 250/637: Performed by: PHYSICIAN ASSISTANT

## 2021-11-20 PROCEDURE — 70486 CT MAXILLOFACIAL W/O DYE: CPT

## 2021-11-20 PROCEDURE — C9046 COCAINE HCL NASAL SOLUTION: HCPCS | Performed by: PHYSICIAN ASSISTANT

## 2021-11-20 PROCEDURE — 99282 EMERGENCY DEPT VISIT SF MDM: CPT

## 2021-11-20 PROCEDURE — 74011250636 HC RX REV CODE- 250/636: Performed by: PHYSICIAN ASSISTANT

## 2021-11-20 PROCEDURE — 75810000293 HC SIMP/SUPERF WND  RPR

## 2021-11-20 RX ORDER — CEFDINIR 300 MG/1
300 CAPSULE ORAL 2 TIMES DAILY
Qty: 14 CAPSULE | Refills: 0 | Status: SHIPPED | OUTPATIENT
Start: 2021-11-20 | End: 2021-11-27

## 2021-11-20 RX ORDER — OXYMETAZOLINE HCL 0.05 %
2 SPRAY, NON-AEROSOL (ML) NASAL
Status: DISCONTINUED | OUTPATIENT
Start: 2021-11-20 | End: 2021-11-21 | Stop reason: HOSPADM

## 2021-11-20 RX ORDER — OXYCODONE AND ACETAMINOPHEN 5; 325 MG/1; MG/1
1 TABLET ORAL
Status: COMPLETED | OUTPATIENT
Start: 2021-11-20 | End: 2021-11-20

## 2021-11-20 RX ORDER — COCAINE HYDROCHLORIDE 40 MG/ML
SOLUTION NASAL
Status: COMPLETED | OUTPATIENT
Start: 2021-11-20 | End: 2021-11-20

## 2021-11-20 RX ORDER — OXYCODONE AND ACETAMINOPHEN 5; 325 MG/1; MG/1
1 TABLET ORAL
Qty: 12 TABLET | Refills: 0 | Status: SHIPPED | OUTPATIENT
Start: 2021-11-20 | End: 2021-11-23

## 2021-11-20 RX ADMIN — OXYCODONE AND ACETAMINOPHEN 1 TABLET: 5; 325 TABLET ORAL at 20:06

## 2021-11-20 RX ADMIN — OXYMETAZOLINE HYDROCHLORIDE 2 SPRAY: 5 SPRAY NASAL at 20:34

## 2021-11-20 RX ADMIN — OXYCODONE AND ACETAMINOPHEN 1 TABLET: 5; 325 TABLET ORAL at 21:28

## 2021-11-20 RX ADMIN — COCAINE HYDROCHLORIDE NASAL 4 ML: 40 SOLUTION TOPICAL at 20:34

## 2021-11-20 NOTE — Clinical Note
[address]  [facility] EMERGENCY DEPT  [address] [place] [address] [id number] [phone]    Work/School Note    Date: 11/20/2021    To Whom It May concern:    Laith Harper was seen and treated today in the emergency room by the following provider(s):  Attending Provider: Mar Javed MD  Physician Assistant: Yessica Aragon. Laith Harper is excused from work/school on 11/20/21 and 11/21/21. He is medically clear to return to work/school on 11/22/2021.        Sincerely,          Yessica Wilson
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
## 2021-11-21 NOTE — DISCHARGE INSTRUCTIONS
Ice/elevation to decrease pain/swelling. Sneeze/cough with your mouth open. Do not blow your nose. Avoid tobacco. Follow up with Ear, Nose, Throat specialist for recheck at your earliest opportunity. Return to the Emergency Dept for any worsening pain, fever, bleeding.

## 2021-11-30 ENCOUNTER — HOSPITAL ENCOUNTER (EMERGENCY)
Age: 36
Discharge: LEFT BEFORE DISCHARGE | End: 2021-11-30
Payer: COMMERCIAL

## 2021-11-30 VITALS
OXYGEN SATURATION: 100 % | HEART RATE: 64 BPM | BODY MASS INDEX: 20.28 KG/M2 | HEIGHT: 73 IN | DIASTOLIC BLOOD PRESSURE: 60 MMHG | WEIGHT: 153 LBS | SYSTOLIC BLOOD PRESSURE: 119 MMHG | RESPIRATION RATE: 14 BRPM | TEMPERATURE: 98 F

## 2021-11-30 PROCEDURE — 75810000275 HC EMERGENCY DEPT VISIT NO LEVEL OF CARE

## 2021-12-01 ENCOUNTER — HOSPITAL ENCOUNTER (EMERGENCY)
Age: 36
Discharge: HOME | End: 2021-12-01
Attending: EMERGENCY MEDICINE
Payer: COMMERCIAL

## 2021-12-01 VITALS
SYSTOLIC BLOOD PRESSURE: 119 MMHG | TEMPERATURE: 98.3 F | DIASTOLIC BLOOD PRESSURE: 65 MMHG | HEART RATE: 61 BPM | BODY MASS INDEX: 20.66 KG/M2 | WEIGHT: 155.87 LBS | HEIGHT: 73 IN | OXYGEN SATURATION: 100 % | RESPIRATION RATE: 14 BRPM

## 2021-12-01 DIAGNOSIS — S02.2XXA CLOSED FRACTURE OF NASAL BONE, INITIAL ENCOUNTER: Primary | ICD-10-CM

## 2021-12-01 PROCEDURE — 99281 EMR DPT VST MAYX REQ PHY/QHP: CPT

## 2021-12-01 RX ORDER — LORAZEPAM 1 MG/1
1 TABLET ORAL
COMMUNITY
End: 2022-02-08 | Stop reason: SDUPTHER
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
## 2021-12-01 NOTE — DISCHARGE INSTRUCTIONS
It was a pleasure taking care of you at [facility] Emergency Department today. We know that when you come to [facility], you are entrusting us with your health, comfort, and safety. Our physicians and nurses honor that trust, and we truly appreciate the opportunity to care for you and your loved ones. We also value your feedback. If you receive a survey about your Emergency Department experience today, please fill it out. We care about our patients' feedback, and we listen to what you have to say. Thank you!

## 2021-12-04 ENCOUNTER — HOSPITAL ENCOUNTER (EMERGENCY)
Age: 36
Discharge: HOME | End: 2021-12-04
Attending: EMERGENCY MEDICINE
Payer: COMMERCIAL

## 2021-12-04 VITALS
HEIGHT: 73 IN | SYSTOLIC BLOOD PRESSURE: 145 MMHG | HEART RATE: 76 BPM | OXYGEN SATURATION: 100 % | BODY MASS INDEX: 20.98 KG/M2 | WEIGHT: 158.29 LBS | RESPIRATION RATE: 14 BRPM | TEMPERATURE: 98.2 F | DIASTOLIC BLOOD PRESSURE: 74 MMHG

## 2021-12-04 DIAGNOSIS — M26.609 TEMPOROMANDIBULAR JOINT DYSFUNCTION: Primary | ICD-10-CM

## 2021-12-04 PROCEDURE — 99281 EMR DPT VST MAYX REQ PHY/QHP: CPT

## 2021-12-04 RX ORDER — NAPROXEN 250 MG/1
250 TABLET ORAL 2 TIMES DAILY WITH MEALS
Qty: 14 TABLET | Refills: 0 | Status: SHIPPED | OUTPATIENT
Start: 2021-12-04 | End: 2021-12-11

## 2021-12-05 NOTE — ED NOTES
2006 - Patient discharge by [facility] - pt sent to the front lobby, with strong and steady gait -  Discharge information / home RX / and reasons to return to the ED were reviewed by the doctor.       This RN was unable to assess pt prior to discharge;;
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
## 2022-01-08 PROCEDURE — 99282 EMERGENCY DEPT VISIT SF MDM: CPT

## 2022-01-09 ENCOUNTER — HOSPITAL ENCOUNTER (OUTPATIENT)
Dept: CT IMAGING | Age: 37
Discharge: HOME | End: 2022-01-09
Attending: EMERGENCY MEDICINE

## 2022-01-09 ENCOUNTER — HOSPITAL ENCOUNTER (EMERGENCY)
Age: 37
Discharge: HOME | End: 2022-01-09
Attending: EMERGENCY MEDICINE
Payer: COMMERCIAL

## 2022-01-09 VITALS
HEART RATE: 60 BPM | DIASTOLIC BLOOD PRESSURE: 75 MMHG | BODY MASS INDEX: 21.15 KG/M2 | WEIGHT: 159.61 LBS | SYSTOLIC BLOOD PRESSURE: 130 MMHG | OXYGEN SATURATION: 99 % | TEMPERATURE: 98.2 F | HEIGHT: 73 IN | RESPIRATION RATE: 17 BRPM

## 2022-01-09 DIAGNOSIS — S05.11XA PERIORBITAL CONTUSION OF RIGHT EYE, INITIAL ENCOUNTER: ICD-10-CM

## 2022-01-09 DIAGNOSIS — S02.2XXA CLOSED FRACTURE OF NASAL BONE, INITIAL ENCOUNTER: Primary | ICD-10-CM

## 2022-01-09 DIAGNOSIS — H11.31 SUBCONJUNCTIVAL HEMORRHAGE OF RIGHT EYE: ICD-10-CM

## 2022-01-09 PROCEDURE — 70450 CT HEAD/BRAIN W/O DYE: CPT

## 2022-01-09 PROCEDURE — 70486 CT MAXILLOFACIAL W/O DYE: CPT

## 2022-01-09 RX ORDER — HYDROCODONE BITARTRATE AND ACETAMINOPHEN 5; 325 MG/1; MG/1
1 TABLET ORAL
Qty: 9 TABLET | Refills: 0 | Status: SHIPPED | OUTPATIENT
Start: 2022-01-09 | End: 2022-01-12

## 2022-01-09 NOTE — ED PROVIDER NOTES
EMERGENCY DEPARTMENT HISTORY AND PHYSICAL EXAM      Date: 1/9/2022  Patient Name: Marvel Aburto    History of Presenting Illness     Chief Complaint   Patient presents with    Facial Injury     Pt reports that he was going up the steps and misjudged it and tripped. Pt reports he hit below his right eye on the doorknob. bruising noted around eye, redness noted as well. Pt reports he did not lose conscousness       History Provided By: Patient    HPI: Marvel Aburto, 39 y.o. male  presents to the ED with cc of facial injury. Patient states he tripped on stairs and hit his face against a doorknob and possibly a cabinet. May have had brief loss of consciousness but mostly was just dazed. He has bruising around his right eye. He does have a headache. No neck pain. No nausea or vomiting. No loss of sight or vision. Past History     Past Medical History:  Past Medical History:   Diagnosis Date    Asthma     Chronic kidney disease     left kidny issiue    Hypercholesterolemia     Hypertension        Past Surgical History:  Past Surgical History:   Procedure Laterality Date    HX ORTHOPAEDIC      left hand    [place] ABDOMEN SURGERY PROC UNLISTED      exploratory    VASCULAR SURGERY PROCEDURE UNLIST      left leg       Medications:  No current facility-administered medications on file prior to encounter. Current Outpatient Medications on File Prior to Encounter   Medication Sig Dispense Refill    LORazepam (ATIVAN) 1 mg tablet Take 1 mg by mouth nightly.  metoprolol tartrate (LOPRESSOR) 25 mg tablet TAKE 1 TABLET BY MOUTH TWICE A DAY 60 Tablet 3       Family History:  No family history on file. Social History:  Social History     Tobacco Use    Smoking status: Current Every Day Smoker     Packs/day: 1.00    Smokeless tobacco: Never Used   Substance Use Topics    Alcohol use: No    Drug use: Not Currently     Types: Marijuana       Allergies:   Allergies   Allergen Reactions    Bactrim [Sulfamethoprim Ds] Rash    Nsaids (Non-Steroidal Anti-Inflammatory Drug) Other (comments)     Pt states he is not supposed to have due to kidney injury       All the above components of the past  history are auto-populated from the electronic record. They have been reviewed and the patient has been interviewed for any pertinent past history that pertains to the patient's chief complaint and reason for visit. Not all pre-populated components may be accurate at the time this note was generated. Review of Systems   Review of Systems   Constitutional: Negative for chills and fever. HENT: Negative for congestion, ear pain, rhinorrhea, sore throat and trouble swallowing. Eyes: Negative for visual disturbance. Respiratory: Negative for cough, chest tightness and shortness of breath. Cardiovascular: Negative for chest pain and palpitations. Gastrointestinal: Negative for abdominal pain, blood in stool, constipation, diarrhea, nausea and vomiting. Genitourinary: Negative for decreased urine volume, difficulty urinating, dysuria and frequency. Musculoskeletal: Negative for back pain and neck pain. Skin: Positive for wound. Negative for color change and rash. Neurological: Positive for headaches. Negative for dizziness, weakness and light-headedness. Physical Exam   Physical Exam  Vitals and nursing note reviewed. Constitutional:       General: He is not in acute distress. Appearance: He is well-developed. He is not ill-appearing. HENT:      Head: Normocephalic. Contusion present. Comments: Right periorbital contusion     Nose: Nasal deformity, septal deviation, signs of injury and nasal tenderness present. No laceration. Right Nostril: No epistaxis or septal hematoma. Left Nostril: No epistaxis or septal hematoma. Mouth/Throat:      Dentition: Normal dentition. Eyes:      Conjunctiva/sclera:      Right eye: Hemorrhage present.       Comments: Subconjunctival hemorrhage of right eye. Anterior chamber appears clear with reactive pupil. Cardiovascular:      Rate and Rhythm: Normal rate and regular rhythm. Pulmonary:      Effort: Pulmonary effort is normal. No accessory muscle usage or respiratory distress. Abdominal:      General: There is no distension. Musculoskeletal:      Cervical back: Normal range of motion. Skin:     General: Skin is warm and dry. Neurological:      Mental Status: He is alert and oriented to person, place, and time. Due to the COVID-19 pandemic, in order to reduce the spread and transmission of the virus, some basic elements of the physical exam have been deferred to reduce direct or close contact with the patient unless they are deemed to be absolutely necessary, regardless of whether the virus is highly suspected or not. Diagnostic Study Results     Labs -   No results found for this or any previous visit (from the past 24 hour(s)). Radiologic Studies -   CT MAXILLOFACIAL WO CONT   Final Result      Acute nasal bone fractures and right periorbital soft tissue   swelling/laceration. CT HEAD WO CONT   Final Result      No acute traumatic injury. CT Results  (Last 48 hours)               01/09/22 0138  CT MAXILLOFACIAL WO CONT Final result    Impression:      Acute nasal bone fractures and right periorbital soft tissue   swelling/laceration. Narrative:  INDICATION:  fell, facial injury        EXAMINATION:  MAXILLOFACIAL CT       COMPARISON:  None       TECHNIQUE:  Axial CT was performed through the maxillofacial bones. Coronal and   sagittal reconstructions were obtained. CT dose reduction was achieved through   use of a standardized protocol tailored for this examination and automatic   exposure control for dose modulation. FINDINGS:       Facial bones:  Acute minimally displaced and comminuted bilateral nasal bone   fractures.    Soft tissues: Nasal and right periorbital soft tissue swelling. Small amount of   gas in the right preseptal space. No retrobulbar hematoma. Paranasal sinuses:  Clear. Orbits:  Normal.           01/09/22 0138  CT HEAD WO CONT Final result    Impression:      No acute traumatic injury. Narrative:  INDICATION:   fall, closed head injury       EXAMINATION:  CT HEAD WO CONTRAST       COMPARISON:  None       TECHNIQUE:  Routine noncontrast axial head CT was performed. Sagittal and   coronal reconstructions were generated. CT dose reduction was achieved through   use of a standardized protocol tailored for this examination and automatic   exposure control for dose modulation. FINDINGS:       Ventricles:  Midline, no hydrocephalus. Intracranial Hemorrhage:  None. Brain Parenchyma/Brainstem:  Normal for age. Basal Cisterns:  Normal.    Paranasal Sinuses:  Visualized sinuses are clear. Soft Tissues:  No significant soft tissue swelling. Osseous Structures:  No acute fracture. Additional Comments:  N/A. CXR Results  (Last 48 hours)    None            Medical Decision Making     I reviewed the vital signs, available nursing notes, past medical history, past surgical history, family history and social history. Vital Signs-I have reviewed the vital signs that have been made available during the patient's emergency department visit. The vital signs auto-populated below are obtained mostly by electronic means through monitoring devices that have been downloaded into the patient's chart by the nursing staff. Some vital signs are not downloaded into the chart until after the patient has been discharged and this note has been completed, therefore some vital signs may not be available to the physician for review prior to patient's discharge or admission.   The physician has reviewed the patient's triage vital signs, monitored the electronic monitoring devices remotely for any significant vital sign abnormalities, and have reviewed vital signs prior to discharge. Some vital signs reviewed at bedside or remotely utilizing electronic monitoring devices may be different than the vital signs downloaded into the electronic medical record. Some vital signs may be erroneous and inaccurate since they are obtained by electronic monitoring devices, and not all vital signs are verified for accuracy by nursing staff prior to downloading into the patient's chart. Patient Vitals for the past 24 hrs:   Temp Pulse Resp BP SpO2   01/09/22 0723 98.2 °F (36.8 °C) 60  130/75 99 %   01/09/22 0018 98.4 °F (36.9 °C) 64 17 136/73 98 %         Records Reviewed: Nursing notes for today's visit have been reviewed. I have also reviewed most recent medical records pertinent to today's complaints, if available in our medical record system. I have also reviewed all labs and imaging results from previous results in comparison to results obtained today. If an EKG was obtained today, it has been compared to previous EKGs, if available. If arriving via EMS, the EMS report has been reviewed if made available to us within the patient's time in the emergency department. Provider Notes (Medical Decision Making):   Patient presents after facial injury. He does have contusion around the right eye. There is also subconjunctival hemorrhage. No imaging findings to suggest fracture of the periorbital bones. He does have nasal fracture. There is some deviation on exam.  No epistaxis or septal hematoma. Will refer to ENT for outpatient treatment of the nasal fractures. Recommend ice to the face. Will prescribe small dosing of pain medicine. Did  the patient regarding concussion precautions. ED Course:   Initial assessment performed. The patients presenting problems have been discussed, and they are in agreement with the care plan formulated and outlined with them. I have encouraged them to ask questions as they arise throughout their visit. Orders Placed This Encounter    CT MAXILLOFACIAL WO CONT    CT HEAD WO CONT    HYDROcodone-acetaminophen (Lorcet, HYDROcodone,) 5-325 mg per tablet       Procedures      Critical Care Time:   0    Disposition:  Discharge    The patient's emergency department evaluation is now complete. I have reviewed all labs, imaging, and pertinent information. I have discussed all results with the patient and/or family. Based on our evaluation today I do believe that the patient is safe to be discharged home. The patient has been provided with at home instructions that are pertinent to their complaint today, although these may not be specific to the exact diagnosis. I have reviewed the patient's home medications and attempted to reconcile if not already done so by pharmacy or nursing staff. I have discussed all new prescriptions with the patient. The patient has been encouraged to follow-up with primary care doctor and/or specialist, and these have been discussed with the patient. The patient has been advised that they may return to the emergency department if they have any worsening symptoms and or new symptoms that are of concern to them. Verbal discharge instructions may have also been provided to the patient that may not be specifically contained in the written discharge instructions. The patient has been given opportunity to ask questions prior to discharge. PLAN:  1. Current Discharge Medication List      START taking these medications    Details   HYDROcodone-acetaminophen (Lorcet, HYDROcodone,) 5-325 mg per tablet Take 1 Tablet by mouth every eight (8) hours as needed for Pain for up to 3 days. Max Daily Amount: 3 Tablets. Qty: 9 Tablet, Refills: 0  Start date: 1/9/2022, End date: 1/12/2022    Associated Diagnoses: Closed fracture of nasal bone, initial encounter; Periorbital contusion of right eye, initial encounter           2.    Follow-up Information     Follow up With Specialties Details Why Huyen Qiu MD Otolaryngology Schedule an appointment as soon as possible for a visit in 1 week nose fracture [address]  [address]  [address] [phone]          Return to ED if worse     Diagnosis     Clinical Impression:   1. Closed fracture of nasal bone, initial encounter    2. Periorbital contusion of right eye, initial encounter    3.  Subconjunctival hemorrhage of right eye

## 2022-01-09 NOTE — ED NOTES
Dr. Jono Fernandes reviewed discharge instructions with the patient. The patient verbalized understanding.

## 2022-01-09 NOTE — DISCHARGE INSTRUCTIONS
It was a pleasure taking care of you in our Emergency Department today. We know that when you come to [facility] you are entrusting us with your health, comfort, and safety. Our physicians and nurses honor that trust, and truly appreciate the opportunity to care for you and your loved ones. We also value your feedback. If you receive a survey about your Emergency Department experience today, please fill it out. We care about our patients' feedback, and we listen to what you have to say. Please read over your discharge instructions as these contain pertinent information to help you in the healing process. These instructions include a list of prescriptions you were given today. Follow-up information is also noted on your discharge papers. There are attached instructions and information pertaining to the reason why you were seen in the emergency department today. These discharge instructions may not be for exactly why you were here, but may be the closest available instructions that we have. These include important advice for things that you can do at home to feel better, and reasons to return to the emergency department. The evaluation and treatment you received in the emergency department is not always definitive care. If follow-up with your primary care doctor or specialist was recommended, it is important that you make these appointments for follow-up care. You may need further testing, procedures, and/or medications to help you feel better. Further tests may be required that are not available in the emergency department. Failure to make these follow-up appointments may jeopardize your health. The emergency department is here for emergent stabilization and evaluation of life and limb threatening illness and/or injuries.   Further care through a specialist or primary care doctor may be required to assist in your healing and complete your treatment and/or evaluation. We may not always be able to make a diagnosis in the emergency department, or things may change that will alter your diagnosis. Our primary goal is to ensure that nothing serious is occurring and that you are stable to continue your treatment and evaluation at home as an outpatient. Of course, if things change, and you feel worse, you are always encouraged to return to the emergency department for re-evaluation. Lab Results Today:  No results found for this or any previous visit (from the past 8 hour(s)). Radiology Results Today:  CT HEAD WO CONT    Result Date: 1/9/2022  No acute traumatic injury. CT MAXILLOFACIAL WO CONT    Result Date: 1/9/2022  Acute nasal bone fractures and right periorbital soft tissue swelling/laceration.

## 2022-01-24 ENCOUNTER — HOSPITAL ENCOUNTER (EMERGENCY)
Age: 37
Discharge: LEFT BEFORE DISCHARGE | End: 2022-01-24

## 2022-01-26 ENCOUNTER — APPOINTMENT (OUTPATIENT)
Dept: GENERAL RADIOLOGY | Age: 37
End: 2022-01-26
Attending: PHYSICIAN ASSISTANT
Payer: COMMERCIAL

## 2022-01-26 ENCOUNTER — HOSPITAL ENCOUNTER (EMERGENCY)
Age: 37
Discharge: HOME | End: 2022-01-26
Attending: EMERGENCY MEDICINE
Payer: COMMERCIAL

## 2022-01-26 VITALS
RESPIRATION RATE: 18 BRPM | TEMPERATURE: 97.8 F | OXYGEN SATURATION: 100 % | HEART RATE: 64 BPM | DIASTOLIC BLOOD PRESSURE: 67 MMHG | SYSTOLIC BLOOD PRESSURE: 125 MMHG

## 2022-01-26 DIAGNOSIS — H91.92: Primary | ICD-10-CM

## 2022-01-26 DIAGNOSIS — M54.2 NECK PAIN: ICD-10-CM

## 2022-01-26 PROCEDURE — 99282 EMERGENCY DEPT VISIT SF MDM: CPT

## 2022-01-26 PROCEDURE — 72050 X-RAY EXAM NECK SPINE 4/5VWS: CPT

## 2022-01-26 NOTE — ED PROVIDER NOTES
EMERGENCY DEPARTMENT HISTORY AND PHYSICAL EXAM      Date: 1/26/2022  Patient Name: Felipa Cowden    History of Presenting Illness     Chief Complaint   Patient presents with    Ear Pain     left ear iwth FB       History Provided By: Patient    HPI: Felipa Cowden, 39 y.o. male with no significant past medical history who presents to the emergency department concerned for a foreign body in his left ear. Patient believes a bug may have gotten lodged in his left ear approximately 1 week ago. He was having some pain but that is since subsided. He states that \"something isn't quite right \"in his left ear but denies any hearing loss. He has had no fevers, drainage from his ear, difficulty breathing, nausea, vomiting, abdominal pain, diarrhea. Patient also requests an x-ray of his neck. Patient was recently seen in the emergency department for a broken nose where he received a CT of his face and head. Patient did not have any neck pain at that time but now endorses a mild midline neck pain that is worse with movement. Denies any loss of motor function or paresthesias. There are no other complaints, changes, or physical findings at this time. PCP: Tori Flores MD    No current facility-administered medications on file prior to encounter. Current Outpatient Medications on File Prior to Encounter   Medication Sig Dispense Refill    LORazepam (ATIVAN) 1 mg tablet Take 1 mg by mouth nightly.       metoprolol tartrate (LOPRESSOR) 25 mg tablet TAKE 1 TABLET BY MOUTH TWICE A DAY 60 Tablet 3       Past History     Past Medical History:  Past Medical History:   Diagnosis Date    Asthma     Chronic kidney disease     left kidny issiue    Hypercholesterolemia     Hypertension        Past Surgical History:  Past Surgical History:   Procedure Laterality Date    HX ORTHOPAEDIC      left hand    [place] ABDOMEN SURGERY PROC UNLISTED      exploratory    VASCULAR SURGERY PROCEDURE UNLIST      left leg       Family History:  No family history on file. Social History:  Social History     Tobacco Use    Smoking status: Current Every Day Smoker     Packs/day: 1.00    Smokeless tobacco: Never Used   Substance Use Topics    Alcohol use: No    Drug use: Not Currently     Types: Marijuana       Allergies: Allergies   Allergen Reactions    Bactrim [Sulfamethoprim Ds] Rash    Nsaids (Non-Steroidal Anti-Inflammatory Drug) Other (comments)     Pt states he is not supposed to have due to kidney injury         Review of Systems   Review of Systems   Constitutional: Negative for chills, fatigue and fever. HENT: Negative for congestion, ear pain and sore throat. Respiratory: Negative for cough and shortness of breath. Cardiovascular: Negative for chest pain. Gastrointestinal: Negative for abdominal pain, diarrhea, nausea and vomiting. Genitourinary: Negative for dysuria and hematuria. Musculoskeletal: Positive for neck pain. Skin: Negative for rash. Neurological: Negative for headaches. All other systems reviewed and are negative. Physical Exam   Physical Exam  Constitutional:       General: He is not in acute distress. Appearance: He is not toxic-appearing. HENT:      Head: Normocephalic. Right Ear: Tympanic membrane and external ear normal.      Left Ear: Tympanic membrane and external ear normal.      Ears:      Comments: Tympanic membranes normal to inspection with no bulging or erythema. No evidence of perforation. No evidence of foreign body. Left ear canal with mild swelling and redness, but no macerated tissue or sign of infection otherwise. Nose: Nose normal. No congestion. Mouth/Throat:      Mouth: Mucous membranes are moist.   Eyes:      Extraocular Movements: Extraocular movements intact. Conjunctiva/sclera: Conjunctivae normal.   Cardiovascular:      Rate and Rhythm: Normal rate. Pulses: Normal pulses. Heart sounds: Normal heart sounds.    Pulmonary: Breath sounds: Normal breath sounds. Abdominal:      Palpations: Abdomen is soft. Musculoskeletal:         General: No swelling. Cervical back: Neck supple. Skin:     General: Skin is warm. Neurological:      Mental Status: He is alert and oriented to person, place, and time. Psychiatric:         Mood and Affect: Mood normal.         Behavior: Behavior normal.         Thought Content: Thought content normal.         Judgment: Judgment normal.         Diagnostic Study Results     Labs -   No results found for this or any previous visit (from the past 12 hour(s)). Radiologic Studies -   XR SPINE CERV 4 OR 5 V   Final Result   No acute abnormality. CT Results  (Last 48 hours)    None        CXR Results  (Last 48 hours)    None            Medical Decision Making   I am the first provider for this patient. I reviewed the vital signs, available nursing notes, past medical history, past surgical history, family history and social history. Vital Signs-Reviewed the patient's vital signs. Patient Vitals for the past 12 hrs:   Temp Pulse Resp BP SpO2   01/26/22 1240 97.8 °F (36.6 °C) 64 18 125/67 100 %       Records Reviewed: Nursing Notes    Provider Notes (Medical Decision Making):   No evidence of foreign body or infection on ENT exam.  Patient requested plain films of his neck secondary to trauma he experienced a few weeks ago. I explained the poor sensitivity of x-rays, and that CT imaging was the optimal test for cervical fractures. Patient did have some mild midline cervical tenderness, there is no obvious sign of trauma and patient had very mild midline tenderness. Patient has full range of motion of the neck with no radicular symptoms. Symptoms are consistent with a likely soft tissue strain. Patient declined CT imaging. Plain films of her negative for fracture or subluxation. Patient to follow-up with ENT for hearing disturbances.   Patient acknowledged understanding of discharge instructions and agreement to treatment plan. ED Course:   Initial assessment performed. The patients presenting problems have been discussed, and they are in agreement with the care plan formulated and outlined with them. I have encouraged them to ask questions as they arise throughout their visit. Critical Care Time: None    Disposition:  Discharge    PLAN:  1. Discharge Medication List as of 1/26/2022  2:04 PM        2. Follow-up Information     Follow up With Specialties Details Why Contact Info    Supriya Thornton MD Internal Medicine Go in 1 week As needed [address] [facility]  [address]  [address] [facility]      Nino Green MD Otolaryngology Schedule an appointment as soon as possible for a visit in 1 week  [address]  [address]  [address] [phone]      [facility] EMERGENCY DEPT Emergency Medicine Go to  If symptoms worsen [address] [place] [address]  [phone]        Return to ED if worse     Diagnosis     Clinical Impression:   1. Disturbed hearing, left    2. Neck pain          Please note that this dictation was completed with [employer], the computer voice recognition software. Quite often unanticipated grammatical, syntax, homophones, and other interpretive errors are inadvertently transcribed by the computer software. Please disregards these errors. Please excuse any errors that have escaped final proofreading.

## 2022-01-26 NOTE — ED NOTES
David Oneal has reviewed discharge instructions with the patient. The patient verbalized understanding. Patient ambulatory out of ED at this time.

## 2022-01-26 NOTE — DISCHARGE INSTRUCTIONS
It was a pleasure taking care of you at [facility] Emergency Department today. We know that when you come to [facility], you are entrusting us with your health, comfort, and safety. Our physicians and nurses honor that trust, and we truly appreciate the opportunity to care for you and your loved ones. We also value your feedback. If you receive a survey about your Emergency Department experience today, please fill it out. We care about our patients' feedback, and we listen to what you have to say. Thank you!

## 2022-02-03 ENCOUNTER — TELEPHONE (OUTPATIENT)
Dept: FAMILY MEDICINE CLINIC | Age: 37
End: 2022-02-03

## 2022-02-03 NOTE — TELEPHONE ENCOUNTER
----- Message from Yanna Poe sent at 1/31/2022  4:59 PM EST -----  Subject: Message to Provider    QUESTIONS  Information for Provider? Pt is needing an Appointment soon for med   refills and mental health check. ---------------------------------------------------------------------------  --------------  Honey Latindae INFO  What is the best way for the office to contact you? OK to leave message on   voicemail  Preferred Call Back Phone Number? [phone]  ---------------------------------------------------------------------------  --------------  SCRIPT ANSWERS  Relationship to Patient?  Self

## 2022-02-03 NOTE — TELEPHONE ENCOUNTER
Unable to reach pt - spoke to person on number provided and was told to [facility] [phone].  Left message to call office

## 2022-02-08 ENCOUNTER — OFFICE VISIT (OUTPATIENT)
Dept: FAMILY MEDICINE CLINIC | Age: 37
End: 2022-02-08
Payer: COMMERCIAL

## 2022-02-08 VITALS
HEIGHT: 73 IN | OXYGEN SATURATION: 98 % | SYSTOLIC BLOOD PRESSURE: 119 MMHG | BODY MASS INDEX: 21.2 KG/M2 | RESPIRATION RATE: 20 BRPM | TEMPERATURE: 98.2 F | HEART RATE: 72 BPM | DIASTOLIC BLOOD PRESSURE: 64 MMHG | WEIGHT: 160 LBS

## 2022-02-08 DIAGNOSIS — Z11.59 NEED FOR HEPATITIS C SCREENING TEST: ICD-10-CM

## 2022-02-08 DIAGNOSIS — G47.01 INSOMNIA DUE TO MEDICAL CONDITION: ICD-10-CM

## 2022-02-08 DIAGNOSIS — F32.A ANXIETY AND DEPRESSION: ICD-10-CM

## 2022-02-08 DIAGNOSIS — F41.9 ANXIETY: ICD-10-CM

## 2022-02-08 DIAGNOSIS — Z13.29 SCREENING FOR THYROID DISORDER: ICD-10-CM

## 2022-02-08 DIAGNOSIS — I10 HYPERTENSION, WELL CONTROLLED: Primary | ICD-10-CM

## 2022-02-08 DIAGNOSIS — F41.9 ANXIETY AND DEPRESSION: ICD-10-CM

## 2022-02-08 DIAGNOSIS — R35.0 FREQUENCY OF URINATION: ICD-10-CM

## 2022-02-08 DIAGNOSIS — R73.03 BORDERLINE DIABETES MELLITUS: ICD-10-CM

## 2022-02-08 DIAGNOSIS — E78.00 HYPERCHOLESTEROLEMIA: ICD-10-CM

## 2022-02-08 DIAGNOSIS — E55.9 VITAMIN D DEFICIENCY: ICD-10-CM

## 2022-02-08 PROCEDURE — 99214 OFFICE O/P EST MOD 30 MIN: CPT | Performed by: INTERNAL MEDICINE

## 2022-02-08 RX ORDER — LORAZEPAM 0.5 MG/1
TABLET ORAL
Qty: 30 TABLET | Refills: 0 | Status: SHIPPED | OUTPATIENT
Start: 2022-02-08 | End: 2022-03-10

## 2022-02-08 NOTE — PATIENT INSTRUCTIONS

## 2022-02-08 NOTE — PROGRESS NOTES
Chief Complaint   Patient presents with    Follow-up    Medication Refill     requesting a change in medication      HPI:  Feliberto Louise is a 39 y.o.  male with h/o asthma, ckd, hypercholesterolemia, hypertension presents for overdue follow-up for medication refill. Blood pressure is at goal. He has been doing well. It is time for blood work. Review of Systems  As per hpi    Past Medical History:   Diagnosis Date    Asthma     Chronic kidney disease     left kidny issiue    Hypercholesterolemia     Hypertension      Past Surgical History:   Procedure Laterality Date    HX ORTHOPAEDIC      left hand    [place] ABDOMEN SURGERY PROC UNLISTED      exploratory    VASCULAR SURGERY PROCEDURE UNLIST      left leg     Social History     Socioeconomic History    Marital status: SINGLE   Tobacco Use    Smoking status: Current Every Day Smoker     Packs/day: 1.00    Smokeless tobacco: Never Used   Substance and Sexual Activity    Alcohol use: No    Drug use: Not Currently     Types: Marijuana    Sexual activity: Yes     Partners: Female     No family history on file. Current Outpatient Medications   Medication Sig Dispense Refill    LORazepam (ATIVAN) 1 mg tablet Take 1 mg by mouth nightly.       metoprolol tartrate (LOPRESSOR) 25 mg tablet TAKE 1 TABLET BY MOUTH TWICE A DAY 60 Tablet 3     Allergies   Allergen Reactions    Bactrim [Sulfamethoprim Ds] Rash    Nsaids (Non-Steroidal Anti-Inflammatory Drug) Other (comments)     Pt states he is not supposed to have due to kidney injury     Objective:  Visit Vitals  /64   Pulse 72   Temp 98.2 °F (36.8 °C) (Temporal)   Resp 20   Ht 6' 1\" (1.854 m)   Wt 160 lb (72.6 kg)   SpO2 98%   BMI 21.11 kg/m²     Physical Exam:   General appearance - alert, well appearing in no distress  Mental status - alert, oriented to person, place, and time  EYE-PERRL, EOMI  ENT-ENT exam normal, no neck nodes or sinus tenderness  Chest - clear to auscultation, no wheezes, rales or rhonchi    Heart - normal rate, regular rhythm, no murmurs  Abdomen - soft, nontender, nondistended, no organomegaly  Ext-peripheral pulses normal, no pedal edema, no clubbing or cyanosis  Neuro -no focal findings  Back-full range of motion, no tenderness, palpable spasm or pain on motion     Assessment/Plan:  Diagnoses and all orders for this visit:    Hypertension, well controlled  -     METABOLIC PANEL, COMPREHENSIVE; Future  -     CBC WITH AUTOMATED DIFF; Future    Vitamin D deficiency  -     VITAMIN D, 25 HYDROXY; Future    Hypercholesterolemia  -     LIPID PANEL; Future    Screening for thyroid disorder  -     TSH 3RD GENERATION; Future    Borderline diabetes mellitus  -     HEMOGLOBIN A1C WITH EAG; Future    Frequency of urination  -     URINALYSIS W/ RFLX MICROSCOPIC; Future    Need for hepatitis C screening test  -     HEPATITIS C AB; Future    Insomnia due to medical condition  -     LORazepam (ATIVAN) 0.5 mg tablet; TAKE 1 TABLET BY MOUTH EVERY DAY AT NIGHT, Normal, Disp-30 Tablet, R-0This request is for a new prescription for a controlled substance as required by Federal/State law. Anxiety  -     LORazepam (ATIVAN) 0.5 mg tablet; TAKE 1 TABLET BY MOUTH EVERY DAY AT NIGHT, Normal, Disp-30 Tablet, R-0This request is for a new prescription for a controlled substance as required by Federal/State law. Anxiety and depression  -     LORazepam (ATIVAN) 0.5 mg tablet; TAKE 1 TABLET BY MOUTH EVERY DAY AT NIGHT, Normal, Disp-30 Tablet, R-0This request is for a new prescription for a controlled substance as required by Federal/State law. -     REFERRAL TO PSYCHIATRY      Patient Instructions        Learning About Anxiety Disorders  What are anxiety disorders? Anxiety disorders are a type of medical problem. They cause severe anxiety. When you feel anxious, you feel that something bad is about to happen. This feeling interferes with your life.   These disorders include:  · Generalized anxiety disorder. You feel worried and stressed about many everyday events and activities. This goes on for several months and disrupts your life on most days. · Panic disorder. You have repeated panic attacks. A panic attack is a sudden, intense fear or anxiety. It may make you feel short of breath. Your heart may pound. · Social anxiety disorder. You feel very anxious about what you will say or do in front of people. For example, you may be scared to talk or eat in public. This problem affects your daily life. · Phobias. You are very scared of a specific object, situation, or activity. For example, you may fear spiders, high places, or small spaces. What are the symptoms? Generalized anxiety disorder  Symptoms may include:  · Feeling worried and stressed about many things almost every day. · Feeling tired or irritable. You may have a hard time concentrating. · Having headaches or muscle aches. · Having a hard time getting to sleep or staying asleep. Panic disorder  You may have repeated panic attacks when there is no reason for feeling afraid. You may change your daily activities because you worry that you will have another attack. Symptoms may include:  · Intense fear, terror, or anxiety. · Trouble breathing or very fast breathing. · Chest pain or tightness. · A heartbeat that races or is not regular. Social anxiety disorder  Symptoms may include:  · Fear about a social situation, such as eating in front of others or speaking in public. You may worry a lot. Or you may be afraid that something bad will happen. · Anxiety that can cause you to blush, sweat, and feel shaky. · A heartbeat that is faster than normal.  · A hard time focusing. Phobias  Symptoms may include:  · More fear than most people of being around an object, being in a situation, or doing an activity. You might also be stressed about the chance of being around the thing you fear.   · Worry about losing control, panicking, fainting, or having physical symptoms like a faster heartbeat when you are around the situation or object. How are these disorders treated? Anxiety disorders can be treated with medicines or counseling. A combination of both may be used. Medicines may include:  · Antidepressants. These may help your symptoms by keeping chemicals in your brain in balance. · Benzodiazepines. These may give you short-term relief of your symptoms. Some people use cognitive-behavioral therapy. A therapist helps you learn to change stressful or bad thoughts into helpful thoughts. Lead a healthy lifestyle  A healthy lifestyle may help you feel better. · Get at least 30 minutes of exercise on most days of the week. Walking is a good choice. · Eat a healthy diet. Include fruits, vegetables, lean proteins, and whole grains in your diet each day. · Try to go to bed at the same time every night. Try for 8 hours of sleep a night. · Find ways to manage stress. Try relaxation exercises. · Avoid alcohol and illegal drugs. Follow-up care is a key part of your treatment and safety. Be sure to make and go to all appointments, and call your doctor if you are having problems. It's also a good idea to know your test results and keep a list of the medicines you take. Where can you learn more? Go to http://www.gray.com/  Enter P842 in the search box to learn more about \"Learning About Anxiety Disorders. \"  Current as of: June 16, 2021               Content Version: 13.0  © 6951-4349 [employer], [employer]orporated. Care instructions adapted under license by [employer] (which disclaims liability or warranty for this information). If you have questions about a medical condition or this instruction, always ask your healthcare professional. [place] [address] any warranty or liability for your use of this information.         Follow-up and Dispositions    · Return in about 3 months (around 5/8/2022), or if symptoms worsen or fail to improve, for routine follow up.

## 2022-03-07 DIAGNOSIS — G47.01 INSOMNIA DUE TO MEDICAL CONDITION: ICD-10-CM

## 2022-03-07 DIAGNOSIS — F32.A ANXIETY AND DEPRESSION: ICD-10-CM

## 2022-03-07 DIAGNOSIS — F41.9 ANXIETY: ICD-10-CM

## 2022-03-07 DIAGNOSIS — F41.9 ANXIETY AND DEPRESSION: ICD-10-CM

## 2022-03-10 RX ORDER — LORAZEPAM 0.5 MG/1
TABLET ORAL
Qty: 30 TABLET | Refills: 0 | Status: SHIPPED | OUTPATIENT
Start: 2022-03-10 | End: 2022-05-02

## 2022-04-20 ENCOUNTER — TELEPHONE (OUTPATIENT)
Dept: FAMILY MEDICINE CLINIC | Age: 37
End: 2022-04-20

## 2022-04-30 DIAGNOSIS — F32.A ANXIETY AND DEPRESSION: ICD-10-CM

## 2022-04-30 DIAGNOSIS — F41.9 ANXIETY: ICD-10-CM

## 2022-04-30 DIAGNOSIS — G47.01 INSOMNIA DUE TO MEDICAL CONDITION: ICD-10-CM

## 2022-04-30 DIAGNOSIS — F41.9 ANXIETY AND DEPRESSION: ICD-10-CM

## 2022-05-02 RX ORDER — LORAZEPAM 0.5 MG/1
TABLET ORAL
Qty: 30 TABLET | Refills: 0 | Status: SHIPPED | OUTPATIENT
Start: 2022-05-02 | End: 2022-06-01

## 2022-05-04 NOTE — TELEPHONE ENCOUNTER
----- Message from Faith Caceres sent at 4/29/2022 11:41 AM EDT -----  Subject: Referral Request    QUESTIONS   Reason for referral request? PT NEEDS A NEW REFERRAL FOR A MENTAL HEALTH   DR , PT WAS REFERRED TO [facility] AND THEY ARE NOT TAKING NEW   PTS ,SO HE NEEDS ANOTHER REFERRAL PT CAN  REFERRAL IF POSSIBLE   Has the physician seen you for this condition before? Yes  Select a date? 2022-04-29  Select the Provider the patient wants to be referred to, if known (PCP or   Specialist)? Outside Physician - X   Preferred Specialist (if applicable)? Do you already have an appointment scheduled? No  Additional Information for Provider?   ---------------------------------------------------------------------------  --------------  CALL BACK INFO  What is the best way for the office to contact you? OK to leave message on   voicemail  Preferred Call Back Phone Number? [phone]  ---------------------------------------------------------------------------  --------------  SCRIPT ANSWERS  Relationship to Patient?  Self

## 2022-06-01 DIAGNOSIS — F32.A ANXIETY AND DEPRESSION: ICD-10-CM

## 2022-06-01 DIAGNOSIS — F41.9 ANXIETY AND DEPRESSION: ICD-10-CM

## 2022-06-01 DIAGNOSIS — F41.9 ANXIETY: ICD-10-CM

## 2022-06-01 DIAGNOSIS — G47.01 INSOMNIA DUE TO MEDICAL CONDITION: ICD-10-CM

## 2022-06-01 RX ORDER — LORAZEPAM 0.5 MG/1
TABLET ORAL
Qty: 30 TABLET | Refills: 0 | Status: SHIPPED | OUTPATIENT
Start: 2022-06-01 | End: 2022-07-08

## 2022-07-08 DIAGNOSIS — F41.9 ANXIETY AND DEPRESSION: ICD-10-CM

## 2022-07-08 DIAGNOSIS — G47.01 INSOMNIA DUE TO MEDICAL CONDITION: ICD-10-CM

## 2022-07-08 DIAGNOSIS — F32.A ANXIETY AND DEPRESSION: ICD-10-CM

## 2022-07-08 DIAGNOSIS — F41.9 ANXIETY: ICD-10-CM

## 2022-07-08 RX ORDER — LORAZEPAM 0.5 MG/1
TABLET ORAL
Qty: 30 TABLET | Refills: 0 | Status: SHIPPED | OUTPATIENT
Start: 2022-07-08 | End: 2022-08-05

## 2022-08-04 DIAGNOSIS — F41.9 ANXIETY AND DEPRESSION: ICD-10-CM

## 2022-08-04 DIAGNOSIS — F41.9 ANXIETY: ICD-10-CM

## 2022-08-04 DIAGNOSIS — G47.01 INSOMNIA DUE TO MEDICAL CONDITION: ICD-10-CM

## 2022-08-04 DIAGNOSIS — F32.A ANXIETY AND DEPRESSION: ICD-10-CM

## 2022-08-05 RX ORDER — LORAZEPAM 0.5 MG/1
TABLET ORAL
Qty: 30 TABLET | Refills: 0 | Status: SHIPPED | OUTPATIENT
Start: 2022-08-05 | End: 2022-08-10 | Stop reason: SDUPTHER

## 2022-08-10 ENCOUNTER — VIRTUAL VISIT (OUTPATIENT)
Dept: FAMILY MEDICINE CLINIC | Age: 37
End: 2022-08-10
Payer: COMMERCIAL

## 2022-08-10 DIAGNOSIS — I10 HYPERTENSION, WELL CONTROLLED: ICD-10-CM

## 2022-08-10 DIAGNOSIS — G47.01 INSOMNIA DUE TO MEDICAL CONDITION: ICD-10-CM

## 2022-08-10 DIAGNOSIS — F41.9 ANXIETY: ICD-10-CM

## 2022-08-10 DIAGNOSIS — F41.9 ANXIETY AND DEPRESSION: ICD-10-CM

## 2022-08-10 DIAGNOSIS — F32.A ANXIETY AND DEPRESSION: ICD-10-CM

## 2022-08-10 DIAGNOSIS — E78.00 HYPERCHOLESTEROLEMIA: Primary | ICD-10-CM

## 2022-08-10 PROCEDURE — 99214 OFFICE O/P EST MOD 30 MIN: CPT | Performed by: INTERNAL MEDICINE

## 2022-08-10 RX ORDER — LORAZEPAM 0.5 MG/1
0.5 TABLET ORAL
Qty: 30 TABLET | Refills: 0 | Status: SHIPPED | OUTPATIENT
Start: 2022-08-10

## 2022-08-10 RX ORDER — METOPROLOL TARTRATE 25 MG/1
25 TABLET, FILM COATED ORAL 2 TIMES DAILY
Qty: 60 TABLET | Refills: 3 | Status: SHIPPED | OUTPATIENT
Start: 2022-08-10

## 2022-08-10 NOTE — PROGRESS NOTES
Chief Complaint   Patient presents with    Follow-up    Medication Refill     1. \"Have you been to the ER, urgent care clinic since your last visit? Hospitalized since your last visit? \" No    2. \"Have you seen or consulted any other health care providers outside of the [address] [place] [address] since your last visit? \" No     3. For patients aged 39-70: Has the patient had a colonoscopy / FIT/ Cologuard? No      If the patient is female:    4. For patients aged 41-77: Has the patient had a mammogram within the past 2 years? NA - based on age or sex      11. For patients aged 21-65: Has the patient had a pap smear?  NA - based on age or sex

## 2022-08-10 NOTE — PROGRESS NOTES
Chief Complaint   Patient presents with    Follow-up    Medication Refill     HPI:  Chris Velázquez is a 39 y.o. male who was seen by synchronous (real-time) audio-video technology on 8/10/2022 for Follow-up and Medication Refill    Assessment & Plan:   Diagnoses and all orders for this visit:    1. Hypercholesterolemia    2. Insomnia due to medical condition  -     LORazepam (ATIVAN) 0.5 mg tablet; Take 1 Tablet by mouth nightly. 3. Anxiety  -     LORazepam (ATIVAN) 0.5 mg tablet; Take 1 Tablet by mouth nightly. 4. Anxiety and depression  -     LORazepam (ATIVAN) 0.5 mg tablet; Take 1 Tablet by mouth nightly. 5. Hypertension, well controlled  -     metoprolol tartrate (LOPRESSOR) 25 mg tablet; Take 1 Tablet by mouth two (2) times a day. I spent at least 20 minutes on this visit with this established patient. [id number]  Subjective:   Chris Velázquez is a 39 y.o.  male with h/o asthma, ckd, hypercholesterolemia, hypertension is seen for follow up. Mean concern for this visit is to get medications refilled. Patient has no complaints. Prior to Admission medications    Medication Sig Start Date End Date Taking? Authorizing Provider   LORazepam (ATIVAN) 0.5 mg tablet TAKE 1 TABLET BY MOUTH EVERY DAY AT NIGHT 8/5/22  Yes Charles Prado MD   metoprolol tartrate (LOPRESSOR) 25 mg tablet TAKE 1 TABLET BY MOUTH TWICE A DAY 7/5/21  Yes Charles Prado MD     There are no problems to display for this patient.    Current Outpatient Medications   Medication Sig Dispense Refill    LORazepam (ATIVAN) 0.5 mg tablet TAKE 1 TABLET BY MOUTH EVERY DAY AT NIGHT 30 Tablet 0    metoprolol tartrate (LOPRESSOR) 25 mg tablet TAKE 1 TABLET BY MOUTH TWICE A DAY 60 Tablet 3     Allergies   Allergen Reactions    Bactrim [Sulfamethoprim Ds] Rash    Nsaids (Non-Steroidal Anti-Inflammatory Drug) Other (comments)     Pt states he is not supposed to have due to kidney injury     Past Medical History:   Diagnosis Date    Asthma     Chronic kidney disease     left kidny issiue    Hypercholesterolemia     Hypertension      Past Surgical History:   Procedure Laterality Date    HX ORTHOPAEDIC      left hand    [place] ABDOMEN SURGERY PROC UNLISTED      exploratory    VASCULAR SURGERY PROCEDURE UNLIST      left leg     History reviewed. No pertinent family history. Social History     Tobacco Use    Smoking status: Every Day     Packs/day: 1.00     Types: Cigarettes    Smokeless tobacco: Never   Substance Use Topics    Alcohol use: No       ROS:    Objective:     Patient-Reported Vitals 4/21/2021   Patient-Reported Weight 180   Patient-Reported Height 6'1   Patient-Reported Pulse 61   Patient-Reported Temperature 98.4   Patient-Reported Systolic  923   Patient-Reported Diastolic 83      General: alert, cooperative, no distress   Mental  status: normal mood, behavior, speech, dress, motor activity, and thought processes, able to follow commands   HENT: NCAT   Neck: no visualized mass   Resp: no respiratory distress   Neuro: no gross deficits   Skin: no discoloration or lesions of concern on visible areas   Psychiatric: normal affect, consistent with stated mood, no evidence of hallucinations     Additional exam findings: We discussed the expected course, resolution and complications of the diagnosis(es) in detail. Medication risks, benefits, costs, interactions, and alternatives were discussed as indicated. I advised him to contact the office if his condition worsens, changes or fails to improve as anticipated. He expressed understanding with the diagnosis(es) and plan. Abdoulaye Barraza, was evaluated through a synchronous (real-time) audio-video encounter. The patient (or guardian if applicable) is aware that this is a billable service, which includes applicable co-pays. This Virtual Visit was conducted with patient's (and/or legal guardian's) consent.  The visit was conducted pursuant to the emergency declaration under the [address] Emergencies Act, [facility] waiver authority and the Coronavirus Preparedness and Response Supplemental Appropriations Act. Patient identification was verified, and a caregiver was present when appropriate. The patient was located at: Home: [address] [address]  The provider was located at:  Facility (Appt Department): [address] [facility] [address]  [address] [facility]        Devin Brooks MD

## 2022-08-28 ENCOUNTER — HOSPITAL ENCOUNTER (EMERGENCY)
Age: 37
Discharge: HOME | End: 2022-08-28
Attending: EMERGENCY MEDICINE
Payer: COMMERCIAL

## 2022-08-28 VITALS
DIASTOLIC BLOOD PRESSURE: 93 MMHG | TEMPERATURE: 98.3 F | HEART RATE: 67 BPM | OXYGEN SATURATION: 96 % | SYSTOLIC BLOOD PRESSURE: 108 MMHG | RESPIRATION RATE: 16 BRPM

## 2022-08-28 DIAGNOSIS — F11.90 HEROIN USE: ICD-10-CM

## 2022-08-28 DIAGNOSIS — T50.901A ACCIDENTAL OVERDOSE, INITIAL ENCOUNTER: Primary | ICD-10-CM

## 2022-08-28 PROCEDURE — 96374 THER/PROPH/DIAG INJ IV PUSH: CPT

## 2022-08-28 PROCEDURE — 99284 EMERGENCY DEPT VISIT MOD MDM: CPT

## 2022-08-28 PROCEDURE — 96376 TX/PRO/DX INJ SAME DRUG ADON: CPT

## 2022-08-28 PROCEDURE — 74011250636 HC RX REV CODE- 250/636: Performed by: EMERGENCY MEDICINE

## 2022-08-28 PROCEDURE — 74011250636 HC RX REV CODE- 250/636

## 2022-08-28 RX ORDER — NALOXONE HYDROCHLORIDE 0.4 MG/ML
0.4 INJECTION, SOLUTION INTRAMUSCULAR; INTRAVENOUS; SUBCUTANEOUS ONCE
Status: COMPLETED | OUTPATIENT
Start: 2022-08-28 | End: 2022-08-28

## 2022-08-28 RX ORDER — NALOXONE HYDROCHLORIDE 1 MG/ML
INJECTION INTRAMUSCULAR; INTRAVENOUS; SUBCUTANEOUS
Status: DISCONTINUED
Start: 2022-08-28 | End: 2022-08-28 | Stop reason: HOSPADM

## 2022-08-28 RX ORDER — NALOXONE HYDROCHLORIDE 0.4 MG/ML
INJECTION, SOLUTION INTRAMUSCULAR; INTRAVENOUS; SUBCUTANEOUS
Status: COMPLETED
Start: 2022-08-28 | End: 2022-08-28

## 2022-08-28 RX ADMIN — NALOXONE HYDROCHLORIDE 0.4 MG: 0.4 INJECTION, SOLUTION INTRAMUSCULAR; INTRAVENOUS; SUBCUTANEOUS at 18:32

## 2022-08-28 RX ADMIN — NALOXONE HYDROCHLORIDE 0.4 MG: 0.4 INJECTION, SOLUTION INTRAMUSCULAR; INTRAVENOUS; SUBCUTANEOUS at 19:25

## 2022-08-28 NOTE — ED TRIAGE NOTES
Patient found at gas station unresponsive by PD, 4 nasal narcan given by PD. EMS called and pt transported. Unresponsive in ER, apneic, sating 83% on RA. 4 L o2 placed and verbal orders for narcan received.

## 2022-08-28 NOTE — ED NOTES
Bedside shift change report given to Dov Ellis (oncoming nurse) by Julia Barnes (offgoing nurse). Report included the following information SBAR, ED Summary, Intake/Output, MAR, Recent Results, and Med Rec Status.

## 2022-08-28 NOTE — ED PROVIDER NOTES
Date of Service:  8/28/2022    Patient:  Cata Card    Chief Complaint:  Overdose       HPI:  Cata Card is a 39 y.o.  male who presents for evaluation of suspected overdose. Patient found unresponsive in a truck at the gas station. Off-duty  provided 4 mg of intranasal Narcan. EMS found the patient hypoventilating at 6 breaths/min with oxygen saturation at 90% on room air. They provided supplemental nasal cannula oxygen but no additional Narcan. Patient arrives here somnolent intermittently responsive for few On his shoulder call his name but does not really follow commands. He is maintaining his own airway. No other information available. Patient denies use of any narcotic medicine       Past Medical History:   Diagnosis Date    Asthma     Chronic kidney disease     left kidny issiue    Hypercholesterolemia     Hypertension        Past Surgical History:   Procedure Laterality Date    HX ORTHOPAEDIC      left hand    AL ABDOMEN SURGERY PROC UNLISTED      exploratory    VASCULAR SURGERY PROCEDURE UNLIST      left leg         No family history on file.     Social History     Socioeconomic History    Marital status: SINGLE     Spouse name: Not on file    Number of children: Not on file    Years of education: Not on file    Highest education level: Not on file   Occupational History    Not on file   Tobacco Use    Smoking status: Every Day     Packs/day: 1.00     Types: Cigarettes    Smokeless tobacco: Never   Substance and Sexual Activity    Alcohol use: No    Drug use: Not Currently     Types: Marijuana    Sexual activity: Yes     Partners: Female   Other Topics Concern    Not on file   Social History Narrative    Not on file     Social Determinants of Health     Financial Resource Strain: Not on file   Food Insecurity: Not on file   Transportation Needs: Not on file   Physical Activity: Not on file   Stress: Not on file   Social Connections: Not on file   Intimate Partner Violence: Not on file   Housing Stability: Not on file         ALLERGIES: Bactrim [sulfamethoprim ds] and Nsaids (non-steroidal anti-inflammatory drug)    Review of Systems   Unable to perform ROS: Mental status change     There were no vitals filed for this visit. Physical Exam  Vitals and nursing note reviewed. Exam conducted with a chaperone present. Constitutional:       Appearance: He is ill-appearing. HENT:      Head: Normocephalic and atraumatic. Nose: Nose normal.      Mouth/Throat:      Mouth: Mucous membranes are moist.   Eyes:      Comments: pinpoint   Cardiovascular:      Rate and Rhythm: Normal rate. Pulmonary:      Effort: Pulmonary effort is normal.      Breath sounds: Normal breath sounds. Abdominal:      General: Abdomen is flat. Musculoskeletal:         General: No deformity. Cervical back: Neck supple. Skin:     General: Skin is warm. Capillary Refill: Capillary refill takes less than 2 seconds. Neurological:      Mental Status: He is disoriented. Psychiatric:      Comments: Unable to assess        [facility]    ED Course as of 08/28/22 2056   Sun Aug 28, 2022   1842 Patient given of 0.4 mg of Narcan, now awake alert and oriented admitting to \"1 small bump\" of heroin [GG]      ED Course User Index  [GG] Hermelindo Solis, DO     VITAL SIGNS:  Patient Vitals for the past 4 hrs:   Temp Pulse Resp BP SpO2   08/28/22 2037 -- 67 16 -- 96 %   08/28/22 2031 -- 76 16 -- 96 %   08/28/22 2030 -- (!) 59 12 (!) 108/93 --   08/28/22 1909 -- 68 8 128/88 (!) 88 %   08/28/22 1854 -- 80 13 134/76 93 %   08/28/22 1841 -- 82 9 136/61 97 %   08/28/22 1840 -- -- -- -- 97 %   08/28/22 1839 -- 82 11 (!) 142/98 97 %   08/28/22 1838 -- -- (!) 5 -- (!) 83 %   08/28/22 1835 98.3 °F (36.8 °C) 68 (!) 7 136/61 95 %         LABS:  No results found for this or any previous visit (from the past 6 hour(s)).      IMAGING:  No orders to display         Medications During Visit:  Medications   naloxone (NARCAN) 1 mg/mL injection (has no administration in time range)   naloxone [facility]) injection 0.4 mg (0.4 mg IntraVENous Given 8/28/22 1832)   naloxone [facility]) injection 0.4 mg (0.4 mg IntraVENous Given 8/28/22 1925)         DECISION MAKING:  Barbara Chairez is a 39 y.o. male who comes in as above. Here, patient arrives somewhat obtunded. He received an additional dose of Narcan and woke up immediately. Admits to using 1 \"bump\" of heroin. Patient required another dose of Narcan shortly thereafter but since has been not showing any signs of hypoxia or respiratory distress. He is awake alert and oriented to carry on conversation with his mother. Patient denies homicidal or suicidal ideations and will be discharged home with outpatient resources for drug abuse      IMPRESSION:  1. Accidental overdose, initial encounter    2. Heroin use        DISPOSITION:  Discharged      Discharge Medication List as of 8/28/2022  8:43 PM           Follow-up Information       Follow up With Specialties Details Why Contact Info    Marce Daniels MD Internal Medicine Physician Schedule an appointment as soon as possible for a visit   [address]  [phone]                The patient is asked to follow-up with their primary care provider in the next several days. They are to call tomorrow for an appointment. The patient is asked to return promptly for any increased concerns or worsening of symptoms. They can return to this emergency department or any other emergency department.     Procedures

## 2022-08-29 NOTE — ED NOTES
Pt discharged to home in [place], mother with him, pt given snacks and gingerale, states understanding of dc instructions and follow up, 2 handouts given for referrals/assistance for drug abuse.

## 2022-08-29 NOTE — ED NOTES
Pt's mother to bedside, she requested pt to be admitted and wants to contact [facility] herself per conversation had in the hallway just prior to her reentering the room, MD made aware, plan is to discharge him home.

## 2024-08-26 ENCOUNTER — OFFICE VISIT (OUTPATIENT)
Age: 39
End: 2024-08-26
Payer: COMMERCIAL

## 2024-08-26 VITALS
SYSTOLIC BLOOD PRESSURE: 145 MMHG | RESPIRATION RATE: 20 BRPM | BODY MASS INDEX: 21.2 KG/M2 | OXYGEN SATURATION: 99 % | HEIGHT: 73 IN | WEIGHT: 160 LBS | DIASTOLIC BLOOD PRESSURE: 85 MMHG | TEMPERATURE: 97.4 F | HEART RATE: 93 BPM

## 2024-08-26 DIAGNOSIS — F41.9 ANXIETY AND DEPRESSION: ICD-10-CM

## 2024-08-26 DIAGNOSIS — F32.A ANXIETY AND DEPRESSION: ICD-10-CM

## 2024-08-26 DIAGNOSIS — E03.9 HYPOTHYROIDISM, UNSPECIFIED TYPE: ICD-10-CM

## 2024-08-26 DIAGNOSIS — Z00.00 ENCOUNTER FOR ANNUAL PHYSICAL EXAM: Primary | ICD-10-CM

## 2024-08-26 DIAGNOSIS — Z11.59 NEED FOR HEPATITIS C SCREENING TEST: ICD-10-CM

## 2024-08-26 DIAGNOSIS — E78.5 DYSLIPIDEMIA (HIGH LDL; LOW HDL): ICD-10-CM

## 2024-08-26 DIAGNOSIS — R73.03 BORDERLINE DIABETES MELLITUS: ICD-10-CM

## 2024-08-26 DIAGNOSIS — Z11.4 ENCOUNTER FOR SCREENING FOR HUMAN IMMUNODEFICIENCY VIRUS (HIV): ICD-10-CM

## 2024-08-26 DIAGNOSIS — Z77.018 EXPOSURE TO HEAVY METALS: ICD-10-CM

## 2024-08-26 DIAGNOSIS — E55.9 VITAMIN D DEFICIENCY: ICD-10-CM

## 2024-08-26 PROCEDURE — 99214 OFFICE O/P EST MOD 30 MIN: CPT | Performed by: INTERNAL MEDICINE

## 2024-08-26 RX ORDER — BUPRENORPHINE HYDROCHLORIDE, NALOXONE HYDROCHLORIDE 8; 2 MG/1; MG/1
FILM, SOLUBLE BUCCAL; SUBLINGUAL
COMMUNITY
Start: 2024-08-21

## 2024-08-26 RX ORDER — CLONIDINE HYDROCHLORIDE 0.1 MG/1
0.1 TABLET ORAL 2 TIMES DAILY PRN
COMMUNITY
Start: 2024-08-21

## 2024-08-26 RX ORDER — QUETIAPINE FUMARATE 25 MG/1
25 TABLET, FILM COATED ORAL
COMMUNITY
Start: 2024-08-21

## 2024-08-26 SDOH — ECONOMIC STABILITY: INCOME INSECURITY: HOW HARD IS IT FOR YOU TO PAY FOR THE VERY BASICS LIKE FOOD, HOUSING, MEDICAL CARE, AND HEATING?: NOT HARD AT ALL

## 2024-08-26 SDOH — ECONOMIC STABILITY: FOOD INSECURITY: WITHIN THE PAST 12 MONTHS, THE FOOD YOU BOUGHT JUST DIDN'T LAST AND YOU DIDN'T HAVE MONEY TO GET MORE.: NEVER TRUE

## 2024-08-26 SDOH — ECONOMIC STABILITY: FOOD INSECURITY: WITHIN THE PAST 12 MONTHS, YOU WORRIED THAT YOUR FOOD WOULD RUN OUT BEFORE YOU GOT MONEY TO BUY MORE.: NEVER TRUE

## 2024-08-26 ASSESSMENT — ANXIETY QUESTIONNAIRES
4. TROUBLE RELAXING: NOT AT ALL
5. BEING SO RESTLESS THAT IT IS HARD TO SIT STILL: SEVERAL DAYS
1. FEELING NERVOUS, ANXIOUS, OR ON EDGE: NOT AT ALL
7. FEELING AFRAID AS IF SOMETHING AWFUL MIGHT HAPPEN: NOT AT ALL
6. BECOMING EASILY ANNOYED OR IRRITABLE: NOT AT ALL
3. WORRYING TOO MUCH ABOUT DIFFERENT THINGS: SEVERAL DAYS
2. NOT BEING ABLE TO STOP OR CONTROL WORRYING: NOT AT ALL
IF YOU CHECKED OFF ANY PROBLEMS ON THIS QUESTIONNAIRE, HOW DIFFICULT HAVE THESE PROBLEMS MADE IT FOR YOU TO DO YOUR WORK, TAKE CARE OF THINGS AT HOME, OR GET ALONG WITH OTHER PEOPLE: NOT DIFFICULT AT ALL
GAD7 TOTAL SCORE: 2

## 2024-08-26 ASSESSMENT — PATIENT HEALTH QUESTIONNAIRE - PHQ9
SUM OF ALL RESPONSES TO PHQ QUESTIONS 1-9: 2
SUM OF ALL RESPONSES TO PHQ QUESTIONS 1-9: 2
2. FEELING DOWN, DEPRESSED OR HOPELESS: SEVERAL DAYS
SUM OF ALL RESPONSES TO PHQ QUESTIONS 1-9: 2
SUM OF ALL RESPONSES TO PHQ9 QUESTIONS 1 & 2: 2
1. LITTLE INTEREST OR PLEASURE IN DOING THINGS: SEVERAL DAYS
SUM OF ALL RESPONSES TO PHQ QUESTIONS 1-9: 2

## 2024-08-26 NOTE — PROGRESS NOTES
Chief Complaint   Patient presents with    Annual Exam    Referral - General     Special blood test      HPI:  Jose Higuera is a 38 y.o. male with hypertension, hypercholesterolemia, asthma, anxiety and depression presents for annual physical. Patient is requesting referral to psychiatry for depression and anxiety.  Patient is doing well. Blood pressure is not at goal. Patient has no complaints.    Review of Systems  As per hpi    Past Medical History:   Diagnosis Date    Asthma     Chronic kidney disease     left kidny issiue    Hypercholesterolemia     Hypertension      Past Surgical History:   Procedure Laterality Date    ORTHOPEDIC SURGERY      left hand    [place] ABDOMEN SURGERY PROC UNLISTED      exploratory    VASCULAR SURGERY      left leg     Social History     Socioeconomic History    Marital status: Single   Tobacco Use    Smoking status: Every Day     Current packs/day: 1.00     Types: Cigarettes    Smokeless tobacco: Never   Substance and Sexual Activity    Alcohol use: No    Drug use: Not Currently     Types: Marijuana (Weed)     Social Determinants of Health     Financial Resource Strain: Low Risk  (8/26/2024)    Overall Financial Resource Strain (CARDIA)     Difficulty of Paying Living Expenses: Not hard at all   Food Insecurity: No Food Insecurity (8/26/2024)    Hunger Vital Sign     Worried About Running Out of Food in the Last Year: Never true     Ran Out of Food in the Last Year: Never true   Transportation Needs: Unknown (8/26/2024)    PRAPARE - Transportation     Lack of Transportation (Non-Medical): No   Housing Stability: Unknown (8/26/2024)    Housing Stability Vital Sign     Homeless in the Last Year: No     No family history on file.  Current Outpatient Medications   Medication Sig Dispense Refill    SUBOXONE 8-2 MG FILM SL film DISSOLVE 1/2 FILM UNDER THE TONGUE TWICE A DAY FOR 7 DAYS      QUEtiapine (SEROQUEL) 25 MG tablet Take 1 tablet by mouth nightly      cloNIDine (CATAPRES) 0.1 MG  Differential; Future  -     [facility]      Return 2-3 weeks, for results review.

## 2024-08-26 NOTE — PROGRESS NOTES
1. Have you been to the ER, urgent care clinic since your last visit?  Hospitalized since your last visit?No    2. Have you seen or consulted any other health care providers outside of the [facility] since your last visit?  Include any pap smears or colon screening. No

## 2024-08-27 ENCOUNTER — TELEPHONE (OUTPATIENT)
Age: 39
End: 2024-08-27

## 2024-08-27 NOTE — TELEPHONE ENCOUNTER
Patient called to schedule NP appt with Catherine long in [facility] que

## 2024-11-12 ENCOUNTER — OFFICE VISIT (OUTPATIENT)
Age: 39
End: 2024-11-12
Payer: COMMERCIAL

## 2024-11-12 VITALS
TEMPERATURE: 98.2 F | BODY MASS INDEX: 20.81 KG/M2 | RESPIRATION RATE: 16 BRPM | SYSTOLIC BLOOD PRESSURE: 131 MMHG | OXYGEN SATURATION: 99 % | HEART RATE: 78 BPM | DIASTOLIC BLOOD PRESSURE: 73 MMHG | HEIGHT: 73 IN | WEIGHT: 157 LBS

## 2024-11-12 DIAGNOSIS — F41.9 ANXIETY: ICD-10-CM

## 2024-11-12 DIAGNOSIS — F39 UNSPECIFIED MOOD (AFFECTIVE) DISORDER (HCC): Primary | ICD-10-CM

## 2024-11-12 PROCEDURE — 90792 PSYCH DIAG EVAL W/MED SRVCS: CPT | Performed by: NURSE PRACTITIONER

## 2024-11-12 RX ORDER — CARIPRAZINE 1.5 MG/1
CAPSULE, GELATIN COATED ORAL
COMMUNITY
Start: 2024-10-09 | End: 2024-11-12 | Stop reason: DRUGHIGH

## 2024-11-12 RX ORDER — ESCITALOPRAM OXALATE 10 MG/1
10 TABLET ORAL DAILY
Qty: 30 TABLET | Refills: 1 | Status: SHIPPED | OUTPATIENT
Start: 2024-11-12

## 2024-11-12 ASSESSMENT — PATIENT HEALTH QUESTIONNAIRE - PHQ9
8. MOVING OR SPEAKING SO SLOWLY THAT OTHER PEOPLE COULD HAVE NOTICED. OR THE OPPOSITE, BEING SO FIGETY OR RESTLESS THAT YOU HAVE BEEN MOVING AROUND A LOT MORE THAN USUAL: NEARLY EVERY DAY
SUM OF ALL RESPONSES TO PHQ QUESTIONS 1-9: 27
4. FEELING TIRED OR HAVING LITTLE ENERGY: NEARLY EVERY DAY
SUM OF ALL RESPONSES TO PHQ QUESTIONS 1-9: 27
7. TROUBLE CONCENTRATING ON THINGS, SUCH AS READING THE NEWSPAPER OR WATCHING TELEVISION: NEARLY EVERY DAY
SUM OF ALL RESPONSES TO PHQ9 QUESTIONS 1 & 2: 6
SUM OF ALL RESPONSES TO PHQ QUESTIONS 1-9: 27
5. POOR APPETITE OR OVEREATING: NEARLY EVERY DAY
3. TROUBLE FALLING OR STAYING ASLEEP: NEARLY EVERY DAY
10. IF YOU CHECKED OFF ANY PROBLEMS, HOW DIFFICULT HAVE THESE PROBLEMS MADE IT FOR YOU TO DO YOUR WORK, TAKE CARE OF THINGS AT HOME, OR GET ALONG WITH OTHER PEOPLE: EXTREMELY DIFFICULT
1. LITTLE INTEREST OR PLEASURE IN DOING THINGS: NEARLY EVERY DAY
2. FEELING DOWN, DEPRESSED OR HOPELESS: NEARLY EVERY DAY
9. THOUGHTS THAT YOU WOULD BE BETTER OFF DEAD, OR OF HURTING YOURSELF: NEARLY EVERY DAY
6. FEELING BAD ABOUT YOURSELF - OR THAT YOU ARE A FAILURE OR HAVE LET YOURSELF OR YOUR FAMILY DOWN: NEARLY EVERY DAY
SUM OF ALL RESPONSES TO PHQ QUESTIONS 1-9: 24

## 2024-11-12 ASSESSMENT — COLUMBIA-SUICIDE SEVERITY RATING SCALE - C-SSRS
1. WITHIN THE PAST MONTH, HAVE YOU WISHED YOU WERE DEAD OR WISHED YOU COULD GO TO SLEEP AND NOT WAKE UP?: YES
2. HAVE YOU ACTUALLY HAD ANY THOUGHTS OF KILLING YOURSELF?: NO
6. HAVE YOU EVER DONE ANYTHING, STARTED TO DO ANYTHING, OR PREPARED TO DO ANYTHING TO END YOUR LIFE?: NO

## 2024-11-12 ASSESSMENT — ANXIETY QUESTIONNAIRES
5. BEING SO RESTLESS THAT IT IS HARD TO SIT STILL: NEARLY EVERY DAY
1. FEELING NERVOUS, ANXIOUS, OR ON EDGE: NEARLY EVERY DAY
3. WORRYING TOO MUCH ABOUT DIFFERENT THINGS: NEARLY EVERY DAY
4. TROUBLE RELAXING: NEARLY EVERY DAY
IF YOU CHECKED OFF ANY PROBLEMS ON THIS QUESTIONNAIRE, HOW DIFFICULT HAVE THESE PROBLEMS MADE IT FOR YOU TO DO YOUR WORK, TAKE CARE OF THINGS AT HOME, OR GET ALONG WITH OTHER PEOPLE: EXTREMELY DIFFICULT
GAD7 TOTAL SCORE: 21
2. NOT BEING ABLE TO STOP OR CONTROL WORRYING: NEARLY EVERY DAY
7. FEELING AFRAID AS IF SOMETHING AWFUL MIGHT HAPPEN: NEARLY EVERY DAY
6. BECOMING EASILY ANNOYED OR IRRITABLE: NEARLY EVERY DAY

## 2024-11-12 NOTE — PROGRESS NOTES
Chief Complaint   Patient presents with    New Patient       History of Present Ilness:   Jose Higuera is a 38 y.o. year old male with a reported history of depression, anxiety, PTSD who presents today to establish care for medication management. Patient referred by his PCP. Patient reports that his symptoms have been present for years and currently are of moderate severity. Patient identifies stress with not being able to keep employment. He is currently prescribed sertraline 50 mg daily and  Vraylar 1.5 mg daily. He reports compliance with these medication and denies any side effects. Patient states that he has not seen any improvement in his symptoms. He states that he does hear voices from time to time that \"say crazy things about what I'm looking at.\" He denies any command hallucinations. Patient reports that his symptoms have caused social, emotional, functional, and occupational impairments.     Additional symptoms include low mood, little interest or pleasure in doing things, difficulty getting to sleep, difficulty staying asleep, feeling tired or having little energy, becomes withdrawn from others feeling overwhelmed, poor concentration, restlessness, and thoughts of death. Patient denies suicide, no intent or plan. Patient has indicated that he would go to the nearest ER or call 911 or [phone] for assistance should his symptoms worsen.     Patient is not currently in therapy. He denies current use of alcohol, THC, or other substances. Patient denies symptoms of psychosis or darwin. He denies current thoughts of self harm, suicide, or homicide.    Past Psychiatric History:  Providers:PCP Sujit Yeh MD  Therapist:None  Diagnosis: depression, anxiety, PTSD  Medication: sertraline, vraylar,   Hospitalization:Denies any hospitalizations  Denies any history of self-harm, suicide attempts, or violence. Admits to some anger issues.      Social History:  Born:[place]  Education:8th grade, received [employer]

## 2024-11-12 NOTE — PATIENT INSTRUCTIONS
[facility]  Address: [address] [place] [address]  Phone: [phone]    [facility]  Address: [address] [place] [address]  Phone: [phone]     [facility]  [address], [place] [address] [address], [place] [address]    PHONE:  [phone]

## 2024-11-12 NOTE — PROGRESS NOTES
Chief Complaint   Patient presents with    New Patient      Vitals:    11/12/24 1036   BP: 131/73   Pulse: 78   Resp: 16   Temp: 98.2 °F (36.8 °C)   SpO2: 99%      Prior to Admission medications    Medication Sig Start Date End Date Taking? Authorizing Provider   VRAYLAR 1.5 MG capsule TAKE 1 CAPSULE BY MOUTH EVERY DAY IN THE MORNING FOR 30 DAYS 10/9/24  Yes Roxy Montana MD   sertraline (ZOLOFT) 50 MG tablet TAKE 1/2 TAB BY MOUTH X 5 DAYS THEN TAKE ONE FILM DAILY 10/9/24  Yes Roxy Montana MD   naloxone 4 MG/0.1ML LIQD nasal spray 1 spray by Nasal route as needed 5/30/23  Yes Roxy Montana MD   metoprolol tartrate (LOPRESSOR) 25 MG tablet Take by mouth 2 times daily 8/10/22  Yes Automatic Reconciliation, Ar   SUBOXONE 8-2 MG FILM SL film DISSOLVE 1/2 FILM UNDER THE TONGUE TWICE A DAY FOR 7 DAYS  Patient not taking: Reported on 11/12/2024 8/21/24   Roxy Montana MD   QUEtiapine (SEROQUEL) 25 MG tablet Take 1 tablet by mouth nightly  Patient not taking: Reported on 11/12/2024 8/21/24   Roxy Montana MD   cloNIDine (CATAPRES) 0.1 MG tablet Take 1 tablet by mouth 2 times daily as needed  Patient not taking: Reported on 11/12/2024 8/21/24   Roxy Montana MD      PHQ-9 Total Score: 27 (11/12/2024 10:35 AM)  Thoughts that you would be better off dead, or of hurting yourself in some way: 3 (11/12/2024 10:35 AM)         11/12/2024    10:35 AM 8/26/2024     2:13 PM 8/10/2022     8:15 AM   PHQ-9    Little interest or pleasure in doing things 3 1 0   Feeling down, depressed, or hopeless 3 1 0   Trouble falling or staying asleep, or sleeping too much 3     Feeling tired or having little energy 3     Poor appetite or overeating 3     Feeling bad about yourself - or that you are a failure or have let yourself or your family down 3     Trouble concentrating on things, such as reading the newspaper or watching television 3     Moving or speaking so slowly that other people could have

## 2024-11-13 ENCOUNTER — TELEPHONE (OUTPATIENT)
Age: 39
End: 2024-11-13

## 2024-11-13 NOTE — TELEPHONE ENCOUNTER
Called pt and left a detailed voice message requesting callback to discuss TRINI request. Provider requesting records from Dr. Sher and Jacob Gonzalez office. TRINI sent to patient via [employer].      Patient previous providers:  Dr. Sher   [facility]   [address] [place] [address]      Dr.Peter Gonzalez  [address] [place] [address]

## 2025-02-03 ENCOUNTER — TELEPHONE (OUTPATIENT)
Age: 40
End: 2025-02-03

## 2025-02-03 NOTE — TELEPHONE ENCOUNTER
Spoke with patient. He wants something emailed or faxed saying that his BP is ok and when he last received Lopressor. He is trying to donate Plasma he said. He said that he has a form but can't bring it in.  Informed him needs to schedule appt. And get labs done. He said he can't. Tried to explain he cursed and hung up.

## 2025-02-03 NOTE — TELEPHONE ENCOUNTER
Patient called,   stating he needs to know the last date that the prescription for Lopressor was prescribed for him    Please call [phone]

## 2025-02-23 ENCOUNTER — HOSPITAL ENCOUNTER (EMERGENCY)
Facility: HOSPITAL | Age: 40
Discharge: ANOTHER HOSPITAL | End: 2025-02-24
Attending: EMERGENCY MEDICINE
Payer: COMMERCIAL

## 2025-02-23 DIAGNOSIS — R44.3 HALLUCINATIONS: Primary | ICD-10-CM

## 2025-02-23 LAB
ALBUMIN SERPL-MCNC: 3.1 G/DL (ref 3.5–5)
ALBUMIN/GLOB SERPL: 1 (ref 1.1–2.2)
ALP SERPL-CCNC: 81 U/L (ref 45–117)
ALT SERPL-CCNC: 21 U/L (ref 12–78)
AMPHET UR QL SCN: NEGATIVE
ANION GAP SERPL CALC-SCNC: 5 MMOL/L (ref 2–12)
APAP SERPL-MCNC: <2 UG/ML (ref 10–30)
APPEARANCE UR: CLEAR
AST SERPL-CCNC: 15 U/L (ref 15–37)
BACTERIA URNS QL MICRO: NEGATIVE /HPF
BARBITURATES UR QL SCN: NEGATIVE
BASOPHILS # BLD: 0.07 K/UL (ref 0–0.1)
BASOPHILS NFR BLD: 1.1 % (ref 0–1)
BENZODIAZ UR QL: NEGATIVE
BILIRUB SERPL-MCNC: 0.1 MG/DL (ref 0.2–1)
BILIRUB UR QL: NEGATIVE
BUN SERPL-MCNC: 11 MG/DL (ref 6–20)
BUN/CREAT SERPL: 14 (ref 12–20)
CALCIUM SERPL-MCNC: 9 MG/DL (ref 8.5–10.1)
CANNABINOIDS UR QL SCN: NEGATIVE
CHLORIDE SERPL-SCNC: 108 MMOL/L (ref 97–108)
CO2 SERPL-SCNC: 28 MMOL/L (ref 21–32)
COCAINE UR QL SCN: POSITIVE
COLOR UR: NORMAL
COMMENT:: NORMAL
CREAT SERPL-MCNC: 0.76 MG/DL (ref 0.7–1.3)
DIFFERENTIAL METHOD BLD: ABNORMAL
EOSINOPHIL # BLD: 0.39 K/UL (ref 0–0.4)
EOSINOPHIL NFR BLD: 5.9 % (ref 0–7)
EPITH CASTS URNS QL MICRO: NORMAL /LPF
ERYTHROCYTE [DISTWIDTH] IN BLOOD BY AUTOMATED COUNT: 16.4 % (ref 11.5–14.5)
ETHANOL SERPL-MCNC: 48 MG/DL (ref 0–0.08)
GLOBULIN SER CALC-MCNC: 3 G/DL (ref 2–4)
GLUCOSE SERPL-MCNC: 88 MG/DL (ref 65–100)
GLUCOSE UR STRIP.AUTO-MCNC: NEGATIVE MG/DL
HCT VFR BLD AUTO: 43.5 % (ref 36.6–50.3)
HGB BLD-MCNC: 13.7 G/DL (ref 12.1–17)
HGB UR QL STRIP: NEGATIVE
HYALINE CASTS URNS QL MICRO: NORMAL /LPF (ref 0–5)
IMM GRANULOCYTES # BLD AUTO: 0.02 K/UL (ref 0–0.04)
IMM GRANULOCYTES NFR BLD AUTO: 0.3 % (ref 0–0.5)
KETONES UR QL STRIP.AUTO: NEGATIVE MG/DL
LEUKOCYTE ESTERASE UR QL STRIP.AUTO: NEGATIVE
LYMPHOCYTES # BLD: 1.76 K/UL (ref 0.8–3.5)
LYMPHOCYTES NFR BLD: 26.7 % (ref 12–49)
Lab: ABNORMAL
MCH RBC QN AUTO: 28.8 PG (ref 26–34)
MCHC RBC AUTO-ENTMCNC: 31.5 G/DL (ref 30–36.5)
MCV RBC AUTO: 91.6 FL (ref 80–99)
METHADONE UR QL: NEGATIVE
MONOCYTES # BLD: 0.52 K/UL (ref 0–1)
MONOCYTES NFR BLD: 7.9 % (ref 5–13)
NEUTS SEG # BLD: 3.83 K/UL (ref 1.8–8)
NEUTS SEG NFR BLD: 58.1 % (ref 32–75)
NITRITE UR QL STRIP.AUTO: NEGATIVE
NRBC # BLD: 0 K/UL (ref 0–0.01)
NRBC BLD-RTO: 0 PER 100 WBC
OPIATES UR QL: NEGATIVE
PCP UR QL: NEGATIVE
PH UR STRIP: 5.5 (ref 5–8)
PLATELET # BLD AUTO: 240 K/UL (ref 150–400)
PMV BLD AUTO: 11.1 FL (ref 8.9–12.9)
POTASSIUM SERPL-SCNC: 4.3 MMOL/L (ref 3.5–5.1)
PROT SERPL-MCNC: 6.1 G/DL (ref 6.4–8.2)
PROT UR STRIP-MCNC: NEGATIVE MG/DL
RBC # BLD AUTO: 4.75 M/UL (ref 4.1–5.7)
RBC #/AREA URNS HPF: NORMAL /HPF (ref 0–5)
SALICYLATES SERPL-MCNC: 1.9 MG/DL (ref 2.8–20)
SODIUM SERPL-SCNC: 141 MMOL/L (ref 136–145)
SP GR UR REFRACTOMETRY: 1.01 (ref 1–1.03)
SPECIMEN HOLD: NORMAL
URINE CULTURE IF INDICATED: NORMAL
UROBILINOGEN UR QL STRIP.AUTO: 0.2 EU/DL (ref 0.2–1)
WBC # BLD AUTO: 6.6 K/UL (ref 4.1–11.1)
WBC URNS QL MICRO: NORMAL /HPF (ref 0–4)

## 2025-02-23 PROCEDURE — 80053 COMPREHEN METABOLIC PANEL: CPT

## 2025-02-23 PROCEDURE — 80143 DRUG ASSAY ACETAMINOPHEN: CPT

## 2025-02-23 PROCEDURE — 80179 DRUG ASSAY SALICYLATE: CPT

## 2025-02-23 PROCEDURE — 80307 DRUG TEST PRSMV CHEM ANLYZR: CPT

## 2025-02-23 PROCEDURE — 81001 URINALYSIS AUTO W/SCOPE: CPT

## 2025-02-23 PROCEDURE — 93005 ELECTROCARDIOGRAM TRACING: CPT | Performed by: PHYSICIAN ASSISTANT

## 2025-02-23 PROCEDURE — 82077 ASSAY SPEC XCP UR&BREATH IA: CPT

## 2025-02-23 PROCEDURE — 85025 COMPLETE CBC W/AUTO DIFF WBC: CPT

## 2025-02-23 PROCEDURE — 99285 EMERGENCY DEPT VISIT HI MDM: CPT

## 2025-02-23 PROCEDURE — 36415 COLL VENOUS BLD VENIPUNCTURE: CPT

## 2025-02-23 ASSESSMENT — PAIN - FUNCTIONAL ASSESSMENT: PAIN_FUNCTIONAL_ASSESSMENT: NONE - DENIES PAIN

## 2025-02-23 NOTE — ED TRIAGE NOTES
TRIAGE NOTE:  Patient comes from home via EMS.  EMS reports his aunt called 911  to have patient admitted for a psych evaluation.  Patient denies any recent drug use, reports he drank one beer this morning.

## 2025-02-23 NOTE — ED PROVIDER NOTES
[facility] EMERGENCY DEPARTMENT  EMERGENCY DEPARTMENT ENCOUNTER      Pt Name: Jose Higuera  MRN: [id number]  Birthdate 1985  Date of evaluation: 2/23/2025  Provider: Luis Carlos Ricardo MD    CHIEF COMPLAINT       Chief Complaint   Patient presents with    Psychiatric Evaluation         HISTORY OF PRESENT ILLNESS   (Location/Symptom, Timing/Onset, Context/Setting, Quality, Duration, Modifying Factors, Severity)  Note limiting factors.   Jose Higuera is a 39 y.o. male who presents to the emergency department      The history is provided by the patient. No  was used.   Mental Health Problem  Presenting symptoms: hallucinations    Presenting symptoms: no self-mutilation and no suicidal thoughts    Timing:  Constant  Progression:  Unchanged  Chronicity:  Chronic  Context: alcohol use, drug abuse and noncompliance    Treatment compliance:  Untreated  Ineffective treatments:  None tried  Associated symptoms: no abdominal pain, no chest pain and no headaches        Nursing Notes were reviewed.    REVIEW OF SYSTEMS    (2-9 systems for level 4, 10 or more for level 5)     Review of Systems   Constitutional:  Negative for activity change, chills and fever.   HENT:  Negative for nosebleeds.    Eyes:  Negative for visual disturbance.   Respiratory:  Negative for shortness of breath.    Cardiovascular:  Negative for chest pain and palpitations.   Gastrointestinal:  Negative for abdominal pain, constipation, diarrhea, nausea and vomiting.   Genitourinary:  Negative for difficulty urinating, dysuria, hematuria and urgency.   Musculoskeletal:  Negative for back pain, neck pain and neck stiffness.   Skin:  Negative for color change.   Allergic/Immunologic: Negative for immunocompromised state.   Neurological:  Negative for dizziness, seizures, syncope, weakness, light-headedness, numbness and headaches.   Psychiatric/Behavioral:  Positive for hallucinations. Negative for behavioral problems, confusion,

## 2025-02-24 ENCOUNTER — HOSPITAL ENCOUNTER (INPATIENT)
Facility: HOSPITAL | Age: 40
LOS: 1 days | Discharge: LEFT BEFORE DISCHARGE | DRG: 751 | End: 2025-02-24
Attending: PSYCHIATRY & NEUROLOGY | Admitting: PSYCHIATRY & NEUROLOGY
Payer: COMMERCIAL

## 2025-02-24 VITALS
OXYGEN SATURATION: 100 % | DIASTOLIC BLOOD PRESSURE: 67 MMHG | HEART RATE: 63 BPM | WEIGHT: 165 LBS | RESPIRATION RATE: 18 BRPM | BODY MASS INDEX: 21.87 KG/M2 | SYSTOLIC BLOOD PRESSURE: 130 MMHG | TEMPERATURE: 98 F | HEIGHT: 73 IN

## 2025-02-24 VITALS
HEIGHT: 73 IN | SYSTOLIC BLOOD PRESSURE: 115 MMHG | WEIGHT: 165 LBS | BODY MASS INDEX: 21.87 KG/M2 | TEMPERATURE: 97.6 F | DIASTOLIC BLOOD PRESSURE: 50 MMHG | RESPIRATION RATE: 16 BRPM | OXYGEN SATURATION: 98 % | HEART RATE: 63 BPM

## 2025-02-24 LAB
EKG ATRIAL RATE: 64 BPM
EKG DIAGNOSIS: NORMAL
EKG P AXIS: 66 DEGREES
EKG P-R INTERVAL: 128 MS
EKG Q-T INTERVAL: 400 MS
EKG QRS DURATION: 100 MS
EKG QTC CALCULATION (BAZETT): 412 MS
EKG R AXIS: 85 DEGREES
EKG T AXIS: 62 DEGREES
EKG VENTRICULAR RATE: 64 BPM

## 2025-02-24 PROCEDURE — 1240000000 HC EMOTIONAL WELLNESS R&B

## 2025-02-24 PROCEDURE — 6370000000 HC RX 637 (ALT 250 FOR IP): Performed by: NURSE PRACTITIONER

## 2025-02-24 RX ORDER — ACETAMINOPHEN 325 MG/1
650 TABLET ORAL EVERY 4 HOURS PRN
Status: DISCONTINUED | OUTPATIENT
Start: 2025-02-24 | End: 2025-02-24 | Stop reason: HOSPADM

## 2025-02-24 RX ORDER — TRAZODONE HYDROCHLORIDE 50 MG/1
50 TABLET ORAL NIGHTLY PRN
Status: DISCONTINUED | OUTPATIENT
Start: 2025-02-24 | End: 2025-02-24 | Stop reason: HOSPADM

## 2025-02-24 RX ORDER — HALOPERIDOL 5 MG/1
5 TABLET ORAL EVERY 4 HOURS PRN
Status: DISCONTINUED | OUTPATIENT
Start: 2025-02-24 | End: 2025-02-24 | Stop reason: HOSPADM

## 2025-02-24 RX ORDER — POLYETHYLENE GLYCOL 3350 17 G/17G
17 POWDER, FOR SOLUTION ORAL DAILY PRN
Status: DISCONTINUED | OUTPATIENT
Start: 2025-02-24 | End: 2025-02-24 | Stop reason: HOSPADM

## 2025-02-24 RX ORDER — MAGNESIUM HYDROXIDE/ALUMINUM HYDROXICE/SIMETHICONE 120; 1200; 1200 MG/30ML; MG/30ML; MG/30ML
30 SUSPENSION ORAL EVERY 6 HOURS PRN
Status: DISCONTINUED | OUTPATIENT
Start: 2025-02-24 | End: 2025-02-24 | Stop reason: HOSPADM

## 2025-02-24 RX ORDER — DIPHENHYDRAMINE HYDROCHLORIDE 50 MG/ML
50 INJECTION INTRAMUSCULAR; INTRAVENOUS EVERY 4 HOURS PRN
Status: DISCONTINUED | OUTPATIENT
Start: 2025-02-24 | End: 2025-02-24 | Stop reason: HOSPADM

## 2025-02-24 RX ORDER — HALOPERIDOL 5 MG/ML
5 INJECTION INTRAMUSCULAR EVERY 4 HOURS PRN
Status: DISCONTINUED | OUTPATIENT
Start: 2025-02-24 | End: 2025-02-24 | Stop reason: HOSPADM

## 2025-02-24 RX ORDER — HYDROXYZINE HYDROCHLORIDE 25 MG/1
50 TABLET, FILM COATED ORAL 3 TIMES DAILY PRN
Status: DISCONTINUED | OUTPATIENT
Start: 2025-02-24 | End: 2025-02-24 | Stop reason: HOSPADM

## 2025-02-24 RX ADMIN — HALOPERIDOL 5 MG: 5 TABLET ORAL at 03:28

## 2025-02-24 RX ADMIN — ACETAMINOPHEN 650 MG: 325 TABLET ORAL at 03:29

## 2025-02-24 RX ADMIN — HYDROXYZINE HYDROCHLORIDE 50 MG: 25 TABLET, FILM COATED ORAL at 03:28

## 2025-02-24 ASSESSMENT — PAIN DESCRIPTION - DESCRIPTORS: DESCRIPTORS: ACHING

## 2025-02-24 ASSESSMENT — PAIN SCALES - GENERAL
PAINLEVEL_OUTOF10: 0
PAINLEVEL_OUTOF10: 7
PAINLEVEL_OUTOF10: 0
PAINLEVEL_OUTOF10: 0

## 2025-02-24 ASSESSMENT — PAIN SCALES - WONG BAKER: WONGBAKER_NUMERICALRESPONSE: NO HURT

## 2025-02-24 ASSESSMENT — LIFESTYLE VARIABLES
HOW MANY STANDARD DRINKS CONTAINING ALCOHOL DO YOU HAVE ON A TYPICAL DAY: 10 OR MORE
HOW OFTEN DO YOU HAVE A DRINK CONTAINING ALCOHOL: 4 OR MORE TIMES A WEEK

## 2025-02-24 ASSESSMENT — SLEEP AND FATIGUE QUESTIONNAIRES
DO YOU USE A SLEEP AID: YES
DO YOU HAVE DIFFICULTY SLEEPING: YES
AVERAGE NUMBER OF SLEEP HOURS: 2
SLEEP PATTERN: DIFFICULTY FALLING ASLEEP

## 2025-02-24 ASSESSMENT — PAIN DESCRIPTION - ORIENTATION: ORIENTATION: OTHER (COMMENT)

## 2025-02-24 ASSESSMENT — PAIN DESCRIPTION - LOCATION: LOCATION: BACK;NECK

## 2025-02-24 NOTE — PROGRESS NOTES
Pt screened per LOS policy.  No acute nutrition or weight issues identified.  Pt meal compliant. Double portions and supplements ordered.  Hx notable for HTN, HLD, CKD, polysubstance abuse (cocaine, ETOH, tobacci).  Ht: 6'1\"  Wt: 165 lb  BMI: 21.77 kg/(m^2) c/w normal weight.  Est energy needs: 2425 kcal, 76 g protein, 1 mL/kcal fluids  Pt will consume > 75% of meals at follow up 7-10 days  LOS

## 2025-02-24 NOTE — PLAN OF CARE
Problem: Discharge Planning  Goal: Discharge to home or other facility with appropriate resources  Outcome: Not Progressing     Problem: Depression  Goal: Will be euthymic at discharge  Description: INTERVENTIONS:  1. Administer medication as ordered  2. Provide emotional support via 1:1 interaction with staff  3. Encourage involvement in milieu/groups/activities  4. Monitor for social isolation  Outcome: Not Progressing     Problem: Psychosis  Goal: Will report no hallucinations or delusions  Description: INTERVENTIONS:  1. Administer medication as  ordered  2. Assist with reality testing to support increasing orientation  3. Assess if patient's hallucinations or delusions are encouraging self harm or harm to others and intervene as appropriate  Outcome: Not Progressing     Problem: Anxiety  Goal: Will report anxiety at manageable levels  Description: INTERVENTIONS:  1. Administer medication as ordered  2. Teach and rehearse alternative coping skills  3. Provide emotional support with 1:1 interaction with staff  Outcome: Not Progressing     Problem: Sleep Disturbance  Goal: Will exhibit normal sleeping pattern  Description: INTERVENTIONS:  1. Administer medication as ordered  2. Decrease environmental stimuli, including noise, as appropriate  3. Discourage social isolation and naps during the day  Outcome: Not Progressing

## 2025-02-24 NOTE — BSMART NOTE
Notified by ED the patient eloped. No ECO criteria as the patient denied SI/HI and does appear to be caring for himself / ADLs.

## 2025-02-24 NOTE — PLAN OF CARE
Problem: Depression  Goal: Will be euthymic at discharge  Description: INTERVENTIONS:  1. Administer medication as ordered  2. Provide emotional support via 1:1 interaction with staff  3. Encourage involvement in milieu/groups/activities  4. Monitor for social isolation  2/24/2025 1133 by Chloe Buckner, RN  Outcome: Progressing       Problem: Anxiety  Goal: Will report anxiety at manageable levels  Description: INTERVENTIONS:  1. Administer medication as ordered  2. Teach and rehearse alternative coping skills  3. Provide emotional support with 1:1 interaction with staff  2/24/2025 1133 by Chloe Buckner, RN  Outcome: Progressing

## 2025-02-24 NOTE — BSMART NOTE
BSMART assessment completed, and suicide risk level noted to be no risk. Charge Nurse ISAIAH Sheth and Physician MD Haleigh notified. Concerns not observed.

## 2025-02-24 NOTE — GROUP NOTE
Group Therapy Note    Date: 2/24/2025    Group Start Time: 1045  Group End Time: 1130  Group Topic: Relaxation    [facility] ACUTE [facility]    Ginna Davila        Group Therapy Note    Attendees: 5       Patient's Goal:  To participate in relaxation activity    Notes:  Pt did not attend session      Discipline Responsible: Recreational Therapist      Signature:  GINNA DAVILA

## 2025-02-24 NOTE — PROGRESS NOTES
[facility] Pharmacy Admission Medication History    Information obtained from: RxQuery, chart review, patient interview  RxQuery data available1: Yes    Comments/recommendations:  Denies taking any over-the-counter or prescription medications at this time. Most recent medications for mental health were Vraylar 3 mg daily + escitalopram 10 mg daily, last filled in November 2024 for 30 day supplies.     Medication changes (since last review):  Added  None  Removed  Cariprazine (Vraylar)  Escitalopram  Metoprolol tartrate  Naloxone nasal spray  Buprenorphine-naloxone  Adjusted  None       1RxQuery pharmacy benefit data reflects medications filled and processed through the patient's insurance, however                this data does NOT capture whether the medication was picked up or is currently being taken by the patient.       Prior to admission medications: None       Thank you,  Magaly Correa, PharmD, BCPS, BCPP  Clinical Pharmacy Specialist, Behavioral Health  Desk: [phone] ([phone])  Pharmacy: [phone] ([phone])

## 2025-02-24 NOTE — BSMART NOTE
The patient received bed placement at [facility] 308/02 via IVANNA Grady/ Sang RICH. Rn 2 Rn x [phone]

## 2025-02-24 NOTE — BSMART NOTE
Notified by ED the patient returned and denied attempting to elope but rather needed to smoke a cigarette.

## 2025-02-24 NOTE — PROGRESS NOTES
Patient received resting in his room.  He is irritable and asking to leave.  He was informed to talk to the doctor this morning but did not say anything to her about discharge when she saw him.  Later patient came to the nurse's station yelling and demanding to be discharged.  BALWINDER Jackson NP was called and she agreed to discharge patient AMA.  Discharge orders were entered.      1330:  All belongings returned.  Patient walked down for discharge.
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
## 2025-02-24 NOTE — PROGRESS NOTES
Pt accepted snacks offered, requested and received PRN Tylenol for pain, Atarax for anxiety and Haldol for psychosis. Pt appears to have slept for 2 hours.

## 2025-02-25 NOTE — H&P
Department of Psychiatry  History and Physical - Adult         CHIEF COMPLAINT:  \"I got kicked out of my house\"    History obtained from:  patient, electronic medical record    HISTORY OF PRESENT ILLNESS:          The patient is a 39 y.o. male who presents with psychosis. He reports that he came to the ED to have tests done to see if something is implanted inside of his head. He wants a PET scan and eye exam but cannot describe symptoms that would necessitate these. He reports that he has been hearing voices for the past 5 years and these voices make it hard for him to sleep at night. He has never taken any medications because he feels they would not work. He has a hard time with focus and difficulties sleeping and driving due to these voices. The voices are non-command in nature. He drinks 2-12 beers per day. Has used other substances but minimizes their use. He has never been on an inpatient psych unit.     PSYCHIATRIC HISTORY:      The patient is not currently receiving care for the above psychiatric illness.    Past mental health outpatient care includes:  No previous outpatient care    Past mental health hospitalizations: No previous hospitalizations    Past Medical History:        Diagnosis Date    Asthma     Chronic kidney disease     left kidny issiue    Hypercholesterolemia     Hypertension      Past Surgical History:        Procedure Laterality Date    ORTHOPEDIC SURGERY      left hand    DC UNLISTED PROCEDURE ABDOMEN PERITONEUM & OMENTUM      exploratory    VASCULAR SURGERY      left leg     Medications Prior to Admission:   No medications prior to admission.  Allergies:  Nsaids and Sulfa antibiotics    Social History:  See History and Present Illness for Alcohol and Drug History    Family History:   No family history on file.  Psychiatric Family History  No family history      PHYSICAL EXAM:    Vitals:  BP (!) 115/50   Pulse 63   Temp 97.6 °F (36.4 °C) (Oral)   Resp 16   Ht 1.854 m (6' 1\")   Wt 74.8

## 2025-02-28 NOTE — DISCHARGE SUMMARY
PSYCHIATRIC DISCHARGE SUMMARY         IDENTIFICATION:    Patient Name  Jose Higuera   Date of Birth 1985   [facility] [id number]   Medical Record Number  [id number]      Age  39 y.o.   PCP Sujit Yeh MD   Admit date:  2/24/2025    Discharge date: 2/28/2025   Room Number  308/02  @ [facility]   Date of Service  2/28/2025            TYPE OF DISCHARGE: AMA                CONDITION AT DISCHARGE: Independent       PROVISIONAL & DISCHARGE DIAGNOSES:      DISCHARGE DIAGNOSIS:   Axis I:  SEE ABOVE  Axis II: SEE ABOVE  Axis III: SEE ABOVE  Axis IV:  lack of structure  Axis V:  <50 on admission, 55+ on discharge     CC & HISTORY OF PRESENT ILLNESS:  39 y.o. male who presents with psychosis. He reports that he came to the ED to have tests done to see if something is implanted inside of his head. He wants a PET scan and eye exam but cannot describe symptoms that would necessitate these. He reports that he has been hearing voices for the past 5 years and these voices make it hard for him to sleep at night. He has never taken any medications because he feels they would not work. He has a hard time with focus and difficulties sleeping and driving due to these voices. The voices are non-command in nature. He drinks 2-12 beers per day. Has used other substances but minimizes their use. He has never been on an inpatient psych unit.      SOCIAL HISTORY:    Social History     Socioeconomic History    Marital status: Single     Spouse name: Not on file    Number of children: Not on file    Years of education: Not on file    Highest education level: Not on file   Occupational History    Not on file   Tobacco Use    Smoking status: Every Day     Current packs/day: 1.00     Types: Cigarettes    Smokeless tobacco: Never   Substance and Sexual Activity    Alcohol use: No    Drug use: Not Currently    Sexual activity: Not Currently   Other Topics Concern    Not on file   Social History Narrative    Not on file

## 2025-03-22 ENCOUNTER — HOSPITAL ENCOUNTER (OUTPATIENT)
Facility: HOSPITAL | Age: 40
Setting detail: OBSERVATION
Discharge: HOME | End: 2025-03-24
Attending: EMERGENCY MEDICINE | Admitting: HOSPITALIST
Payer: COMMERCIAL

## 2025-03-22 DIAGNOSIS — F41.9 ACUTE ANXIETY: ICD-10-CM

## 2025-03-22 DIAGNOSIS — F10.29 ALCOHOL DEPENDENCE WITH UNSPECIFIED ALCOHOL-INDUCED DISORDER: ICD-10-CM

## 2025-03-22 DIAGNOSIS — E86.0 SEVERE DEHYDRATION: ICD-10-CM

## 2025-03-22 DIAGNOSIS — F10.939 ALCOHOL WITHDRAWAL SYNDROME WITH COMPLICATION (HCC): Primary | ICD-10-CM

## 2025-03-22 DIAGNOSIS — R00.0 SINUS TACHYCARDIA: ICD-10-CM

## 2025-03-22 DIAGNOSIS — F14.90 COCAINE USE: ICD-10-CM

## 2025-03-22 PROBLEM — F10.931 ALCOHOL WITHDRAWAL DELIRIUM (HCC): Status: ACTIVE | Noted: 2025-03-22

## 2025-03-22 LAB
ALBUMIN SERPL-MCNC: 3.7 G/DL (ref 3.5–5)
ALBUMIN/GLOB SERPL: 1.1 (ref 1.1–2.2)
ALP SERPL-CCNC: 103 U/L (ref 45–117)
ALT SERPL-CCNC: 28 U/L (ref 12–78)
AMPHET UR QL SCN: NEGATIVE
ANION GAP SERPL CALC-SCNC: 9 MMOL/L (ref 2–12)
AST SERPL-CCNC: 18 U/L (ref 15–37)
BARBITURATES UR QL SCN: NEGATIVE
BASOPHILS # BLD: 0.06 K/UL (ref 0–0.1)
BASOPHILS NFR BLD: 0.6 % (ref 0–1)
BENZODIAZ UR QL: NEGATIVE
BILIRUB SERPL-MCNC: 0.3 MG/DL (ref 0.2–1)
BUN SERPL-MCNC: 13 MG/DL (ref 6–20)
BUN/CREAT SERPL: 11 (ref 12–20)
CALCIUM SERPL-MCNC: 8.4 MG/DL (ref 8.5–10.1)
CANNABINOIDS UR QL SCN: NEGATIVE
CHLORIDE SERPL-SCNC: 106 MMOL/L (ref 97–108)
CO2 SERPL-SCNC: 27 MMOL/L (ref 21–32)
COCAINE UR QL SCN: POSITIVE
CREAT SERPL-MCNC: 1.16 MG/DL (ref 0.7–1.3)
DIFFERENTIAL METHOD BLD: ABNORMAL
EOSINOPHIL # BLD: 0.06 K/UL (ref 0–0.4)
EOSINOPHIL NFR BLD: 0.6 % (ref 0–7)
ERYTHROCYTE [DISTWIDTH] IN BLOOD BY AUTOMATED COUNT: 16.2 % (ref 11.5–14.5)
ETHANOL SERPL-MCNC: 99 MG/DL (ref 0–0.08)
GLOBULIN SER CALC-MCNC: 3.3 G/DL (ref 2–4)
GLUCOSE SERPL-MCNC: 97 MG/DL (ref 65–100)
HCT VFR BLD AUTO: 48.2 % (ref 36.6–50.3)
HGB BLD-MCNC: 15.8 G/DL (ref 12.1–17)
IMM GRANULOCYTES # BLD AUTO: 0.03 K/UL (ref 0–0.04)
IMM GRANULOCYTES NFR BLD AUTO: 0.3 % (ref 0–0.5)
LYMPHOCYTES # BLD: 1.29 K/UL (ref 0.8–3.5)
LYMPHOCYTES NFR BLD: 13 % (ref 12–49)
Lab: ABNORMAL
MAGNESIUM SERPL-MCNC: 1.7 MG/DL (ref 1.6–2.4)
MCH RBC QN AUTO: 29.5 PG (ref 26–34)
MCHC RBC AUTO-ENTMCNC: 32.8 G/DL (ref 30–36.5)
MCV RBC AUTO: 89.9 FL (ref 80–99)
METHADONE UR QL: NEGATIVE
MONOCYTES # BLD: 0.82 K/UL (ref 0–1)
MONOCYTES NFR BLD: 8.3 % (ref 5–13)
NEUTS SEG # BLD: 7.64 K/UL (ref 1.8–8)
NEUTS SEG NFR BLD: 77.2 % (ref 32–75)
NRBC # BLD: 0 K/UL (ref 0–0.01)
NRBC BLD-RTO: 0 PER 100 WBC
OPIATES UR QL: NEGATIVE
PCP UR QL: NEGATIVE
PLATELET # BLD AUTO: 305 K/UL (ref 150–400)
PMV BLD AUTO: 10.4 FL (ref 8.9–12.9)
POTASSIUM SERPL-SCNC: 3.8 MMOL/L (ref 3.5–5.1)
PROT SERPL-MCNC: 7 G/DL (ref 6.4–8.2)
RBC # BLD AUTO: 5.36 M/UL (ref 4.1–5.7)
SODIUM SERPL-SCNC: 142 MMOL/L (ref 136–145)
TROPONIN I SERPL HS-MCNC: 7 NG/L (ref 0–76)
WBC # BLD AUTO: 9.9 K/UL (ref 4.1–11.1)

## 2025-03-22 PROCEDURE — 96375 TX/PRO/DX INJ NEW DRUG ADDON: CPT

## 2025-03-22 PROCEDURE — 1200000000 HC SEMI PRIVATE

## 2025-03-22 PROCEDURE — 84484 ASSAY OF TROPONIN QUANT: CPT

## 2025-03-22 PROCEDURE — 96361 HYDRATE IV INFUSION ADD-ON: CPT

## 2025-03-22 PROCEDURE — 83735 ASSAY OF MAGNESIUM: CPT

## 2025-03-22 PROCEDURE — 2580000003 HC RX 258: Performed by: EMERGENCY MEDICINE

## 2025-03-22 PROCEDURE — 2500000003 HC RX 250 WO HCPCS: Performed by: HOSPITALIST

## 2025-03-22 PROCEDURE — 6370000000 HC RX 637 (ALT 250 FOR IP): Performed by: EMERGENCY MEDICINE

## 2025-03-22 PROCEDURE — 82077 ASSAY SPEC XCP UR&BREATH IA: CPT

## 2025-03-22 PROCEDURE — 6370000000 HC RX 637 (ALT 250 FOR IP): Performed by: HOSPITALIST

## 2025-03-22 PROCEDURE — 96374 THER/PROPH/DIAG INJ IV PUSH: CPT

## 2025-03-22 PROCEDURE — 93005 ELECTROCARDIOGRAM TRACING: CPT | Performed by: EMERGENCY MEDICINE

## 2025-03-22 PROCEDURE — 36415 COLL VENOUS BLD VENIPUNCTURE: CPT

## 2025-03-22 PROCEDURE — G0378 HOSPITAL OBSERVATION PER HR: HCPCS

## 2025-03-22 PROCEDURE — 6360000002 HC RX W HCPCS: Performed by: EMERGENCY MEDICINE

## 2025-03-22 PROCEDURE — 80307 DRUG TEST PRSMV CHEM ANLYZR: CPT

## 2025-03-22 PROCEDURE — 99285 EMERGENCY DEPT VISIT HI MDM: CPT

## 2025-03-22 PROCEDURE — 85025 COMPLETE CBC W/AUTO DIFF WBC: CPT

## 2025-03-22 PROCEDURE — 96376 TX/PRO/DX INJ SAME DRUG ADON: CPT

## 2025-03-22 PROCEDURE — 80053 COMPREHEN METABOLIC PANEL: CPT

## 2025-03-22 PROCEDURE — 2580000003 HC RX 258: Performed by: HOSPITALIST

## 2025-03-22 RX ORDER — ONDANSETRON 4 MG/1
4 TABLET, ORALLY DISINTEGRATING ORAL EVERY 8 HOURS PRN
Status: DISCONTINUED | OUTPATIENT
Start: 2025-03-22 | End: 2025-03-24 | Stop reason: HOSPADM

## 2025-03-22 RX ORDER — LORAZEPAM 2 MG/ML
4 INJECTION INTRAMUSCULAR
Status: DISCONTINUED | OUTPATIENT
Start: 2025-03-22 | End: 2025-03-24 | Stop reason: HOSPADM

## 2025-03-22 RX ORDER — ACETAMINOPHEN 325 MG/1
650 TABLET ORAL EVERY 6 HOURS PRN
Status: DISCONTINUED | OUTPATIENT
Start: 2025-03-22 | End: 2025-03-24 | Stop reason: HOSPADM

## 2025-03-22 RX ORDER — ONDANSETRON 2 MG/ML
4 INJECTION INTRAMUSCULAR; INTRAVENOUS EVERY 6 HOURS PRN
Status: DISCONTINUED | OUTPATIENT
Start: 2025-03-22 | End: 2025-03-24 | Stop reason: HOSPADM

## 2025-03-22 RX ORDER — LORAZEPAM 1 MG/1
3 TABLET ORAL
Status: DISCONTINUED | OUTPATIENT
Start: 2025-03-22 | End: 2025-03-24 | Stop reason: HOSPADM

## 2025-03-22 RX ORDER — LANOLIN ALCOHOL/MO/W.PET/CERES
100 CREAM (GRAM) TOPICAL DAILY
Status: DISCONTINUED | OUTPATIENT
Start: 2025-03-22 | End: 2025-03-24 | Stop reason: HOSPADM

## 2025-03-22 RX ORDER — CLONIDINE HYDROCHLORIDE 0.1 MG/1
TABLET ORAL
Status: ON HOLD | COMMUNITY
Start: 2025-03-14 | End: 2025-03-24 | Stop reason: HOSPADM

## 2025-03-22 RX ORDER — LORAZEPAM 2 MG/ML
2 INJECTION INTRAMUSCULAR ONCE
Status: COMPLETED | OUTPATIENT
Start: 2025-03-22 | End: 2025-03-22

## 2025-03-22 RX ORDER — SODIUM CHLORIDE 9 MG/ML
INJECTION, SOLUTION INTRAVENOUS PRN
Status: DISCONTINUED | OUTPATIENT
Start: 2025-03-22 | End: 2025-03-24 | Stop reason: HOSPADM

## 2025-03-22 RX ORDER — LORAZEPAM 1 MG/1
2 TABLET ORAL
Status: DISCONTINUED | OUTPATIENT
Start: 2025-03-22 | End: 2025-03-24 | Stop reason: HOSPADM

## 2025-03-22 RX ORDER — ENOXAPARIN SODIUM 100 MG/ML
40 INJECTION SUBCUTANEOUS DAILY
Status: DISCONTINUED | OUTPATIENT
Start: 2025-03-23 | End: 2025-03-24 | Stop reason: HOSPADM

## 2025-03-22 RX ORDER — ACETAMINOPHEN 650 MG/1
650 SUPPOSITORY RECTAL EVERY 6 HOURS PRN
Status: DISCONTINUED | OUTPATIENT
Start: 2025-03-22 | End: 2025-03-24 | Stop reason: HOSPADM

## 2025-03-22 RX ORDER — 0.9 % SODIUM CHLORIDE 0.9 %
1000 INTRAVENOUS SOLUTION INTRAVENOUS ONCE
Status: COMPLETED | OUTPATIENT
Start: 2025-03-22 | End: 2025-03-22

## 2025-03-22 RX ORDER — ONDANSETRON 2 MG/ML
4 INJECTION INTRAMUSCULAR; INTRAVENOUS
Status: COMPLETED | OUTPATIENT
Start: 2025-03-22 | End: 2025-03-22

## 2025-03-22 RX ORDER — SODIUM CHLORIDE 9 MG/ML
INJECTION, SOLUTION INTRAVENOUS CONTINUOUS
Status: DISCONTINUED | OUTPATIENT
Start: 2025-03-22 | End: 2025-03-24 | Stop reason: HOSPADM

## 2025-03-22 RX ORDER — LORAZEPAM 2 MG/ML
2 INJECTION INTRAMUSCULAR
Status: DISCONTINUED | OUTPATIENT
Start: 2025-03-22 | End: 2025-03-24 | Stop reason: HOSPADM

## 2025-03-22 RX ORDER — POLYETHYLENE GLYCOL 3350 17 G/17G
17 POWDER, FOR SOLUTION ORAL DAILY PRN
Status: DISCONTINUED | OUTPATIENT
Start: 2025-03-22 | End: 2025-03-24 | Stop reason: HOSPADM

## 2025-03-22 RX ORDER — DIAZEPAM 5 MG/1
5 TABLET ORAL ONCE
Status: DISCONTINUED | OUTPATIENT
Start: 2025-03-22 | End: 2025-03-22

## 2025-03-22 RX ORDER — LORAZEPAM 2 MG/ML
1 INJECTION INTRAMUSCULAR
Status: DISCONTINUED | OUTPATIENT
Start: 2025-03-22 | End: 2025-03-24 | Stop reason: HOSPADM

## 2025-03-22 RX ORDER — MAGNESIUM HYDROXIDE/ALUMINUM HYDROXICE/SIMETHICONE 120; 1200; 1200 MG/30ML; MG/30ML; MG/30ML
30 SUSPENSION ORAL EVERY 6 HOURS PRN
Status: DISCONTINUED | OUTPATIENT
Start: 2025-03-22 | End: 2025-03-24 | Stop reason: HOSPADM

## 2025-03-22 RX ORDER — LORAZEPAM 2 MG/ML
3 INJECTION INTRAMUSCULAR
Status: DISCONTINUED | OUTPATIENT
Start: 2025-03-22 | End: 2025-03-24 | Stop reason: HOSPADM

## 2025-03-22 RX ORDER — MAGNESIUM SULFATE IN WATER 40 MG/ML
2000 INJECTION, SOLUTION INTRAVENOUS PRN
Status: DISCONTINUED | OUTPATIENT
Start: 2025-03-22 | End: 2025-03-24 | Stop reason: HOSPADM

## 2025-03-22 RX ORDER — LORAZEPAM 1 MG/1
1 TABLET ORAL
Status: DISCONTINUED | OUTPATIENT
Start: 2025-03-22 | End: 2025-03-24 | Stop reason: HOSPADM

## 2025-03-22 RX ORDER — SODIUM CHLORIDE 9 MG/ML
INJECTION, SOLUTION INTRAVENOUS CONTINUOUS
Status: DISPENSED | OUTPATIENT
Start: 2025-03-22 | End: 2025-03-23

## 2025-03-22 RX ORDER — FOLIC ACID 1 MG/1
1 TABLET ORAL DAILY
Status: DISCONTINUED | OUTPATIENT
Start: 2025-03-22 | End: 2025-03-24 | Stop reason: HOSPADM

## 2025-03-22 RX ORDER — CHLORDIAZEPOXIDE HYDROCHLORIDE 5 MG/1
5 CAPSULE, GELATIN COATED ORAL ONCE
Status: COMPLETED | OUTPATIENT
Start: 2025-03-22 | End: 2025-03-22

## 2025-03-22 RX ORDER — TRAZODONE HYDROCHLORIDE 50 MG/1
TABLET ORAL
COMMUNITY
Start: 2025-03-14

## 2025-03-22 RX ORDER — POLYETHYLENE GLYCOL 3350 17 G
2 POWDER IN PACKET (EA) ORAL
Status: DISCONTINUED | OUTPATIENT
Start: 2025-03-22 | End: 2025-03-24 | Stop reason: HOSPADM

## 2025-03-22 RX ORDER — SODIUM CHLORIDE 0.9 % (FLUSH) 0.9 %
5-40 SYRINGE (ML) INJECTION EVERY 12 HOURS SCHEDULED
Status: DISCONTINUED | OUTPATIENT
Start: 2025-03-22 | End: 2025-03-24 | Stop reason: HOSPADM

## 2025-03-22 RX ORDER — SODIUM CHLORIDE 0.9 % (FLUSH) 0.9 %
5-40 SYRINGE (ML) INJECTION PRN
Status: DISCONTINUED | OUTPATIENT
Start: 2025-03-22 | End: 2025-03-24 | Stop reason: HOSPADM

## 2025-03-22 RX ORDER — LORAZEPAM 1 MG/1
4 TABLET ORAL
Status: DISCONTINUED | OUTPATIENT
Start: 2025-03-22 | End: 2025-03-24 | Stop reason: HOSPADM

## 2025-03-22 RX ADMIN — SODIUM CHLORIDE: 0.9 INJECTION, SOLUTION INTRAVENOUS at 23:31

## 2025-03-22 RX ADMIN — SODIUM CHLORIDE 1000 ML: 900 INJECTION, SOLUTION INTRAVENOUS at 21:14

## 2025-03-22 RX ADMIN — LORAZEPAM 1 MG: 2 INJECTION INTRAMUSCULAR; INTRAVENOUS at 22:16

## 2025-03-22 RX ADMIN — Medication 100 MG: at 22:14

## 2025-03-22 RX ADMIN — CHLORDIAZEPOXIDE HYDROCHLORIDE 5 MG: 5 CAPSULE ORAL at 23:43

## 2025-03-22 RX ADMIN — SODIUM CHLORIDE: 900 INJECTION, SOLUTION INTRAVENOUS at 22:20

## 2025-03-22 RX ADMIN — ONDANSETRON 4 MG: 2 INJECTION, SOLUTION INTRAMUSCULAR; INTRAVENOUS at 21:14

## 2025-03-22 RX ADMIN — LORAZEPAM 2 MG: 1 TABLET ORAL at 23:43

## 2025-03-22 RX ADMIN — SODIUM CHLORIDE, PRESERVATIVE FREE 10 ML: 5 INJECTION INTRAVENOUS at 23:26

## 2025-03-22 RX ADMIN — LORAZEPAM 2 MG: 2 INJECTION INTRAMUSCULAR; INTRAVENOUS at 21:14

## 2025-03-22 RX ADMIN — FOLIC ACID 1 MG: 1 TABLET ORAL at 22:14

## 2025-03-22 ASSESSMENT — LIFESTYLE VARIABLES
HOW OFTEN DO YOU HAVE A DRINK CONTAINING ALCOHOL: 4 OR MORE TIMES A WEEK
HOW MANY STANDARD DRINKS CONTAINING ALCOHOL DO YOU HAVE ON A TYPICAL DAY: 5 OR 6

## 2025-03-22 ASSESSMENT — PAIN - FUNCTIONAL ASSESSMENT: PAIN_FUNCTIONAL_ASSESSMENT: 0-10

## 2025-03-22 ASSESSMENT — PAIN SCALES - GENERAL: PAINLEVEL_OUTOF10: 8

## 2025-03-23 LAB
ALBUMIN SERPL-MCNC: 2.9 G/DL (ref 3.5–5)
ALBUMIN/GLOB SERPL: 1 (ref 1.1–2.2)
ALP SERPL-CCNC: 86 U/L (ref 45–117)
ALT SERPL-CCNC: 22 U/L (ref 12–78)
ANION GAP SERPL CALC-SCNC: 9 MMOL/L (ref 2–12)
AST SERPL-CCNC: 32 U/L (ref 15–37)
BASOPHILS # BLD: 0.06 K/UL (ref 0–0.1)
BASOPHILS NFR BLD: 0.7 % (ref 0–1)
BILIRUB SERPL-MCNC: 0.3 MG/DL (ref 0.2–1)
BUN SERPL-MCNC: 12 MG/DL (ref 6–20)
BUN/CREAT SERPL: 13 (ref 12–20)
CALCIUM SERPL-MCNC: 7.5 MG/DL (ref 8.5–10.1)
CHLORIDE SERPL-SCNC: 108 MMOL/L (ref 97–108)
CO2 SERPL-SCNC: 28 MMOL/L (ref 21–32)
CREAT SERPL-MCNC: 0.94 MG/DL (ref 0.7–1.3)
DIFFERENTIAL METHOD BLD: ABNORMAL
EOSINOPHIL # BLD: 0.06 K/UL (ref 0–0.4)
EOSINOPHIL NFR BLD: 0.7 % (ref 0–7)
ERYTHROCYTE [DISTWIDTH] IN BLOOD BY AUTOMATED COUNT: 16.3 % (ref 11.5–14.5)
GLOBULIN SER CALC-MCNC: 2.9 G/DL (ref 2–4)
GLUCOSE SERPL-MCNC: 93 MG/DL (ref 65–100)
HCT VFR BLD AUTO: 43.5 % (ref 36.6–50.3)
HGB BLD-MCNC: 14.3 G/DL (ref 12.1–17)
IMM GRANULOCYTES # BLD AUTO: 0.03 K/UL (ref 0–0.04)
IMM GRANULOCYTES NFR BLD AUTO: 0.3 % (ref 0–0.5)
LYMPHOCYTES # BLD: 1.87 K/UL (ref 0.8–3.5)
LYMPHOCYTES NFR BLD: 21.1 % (ref 12–49)
MCH RBC QN AUTO: 29.5 PG (ref 26–34)
MCHC RBC AUTO-ENTMCNC: 32.9 G/DL (ref 30–36.5)
MCV RBC AUTO: 89.7 FL (ref 80–99)
MONOCYTES # BLD: 0.57 K/UL (ref 0–1)
MONOCYTES NFR BLD: 6.4 % (ref 5–13)
NEUTS SEG # BLD: 6.29 K/UL (ref 1.8–8)
NEUTS SEG NFR BLD: 70.8 % (ref 32–75)
NRBC # BLD: 0 K/UL (ref 0–0.01)
NRBC BLD-RTO: 0 PER 100 WBC
PLATELET # BLD AUTO: 250 K/UL (ref 150–400)
PMV BLD AUTO: 11 FL (ref 8.9–12.9)
POTASSIUM SERPL-SCNC: 4.4 MMOL/L (ref 3.5–5.1)
PROT SERPL-MCNC: 5.8 G/DL (ref 6.4–8.2)
RBC # BLD AUTO: 4.85 M/UL (ref 4.1–5.7)
SODIUM SERPL-SCNC: 145 MMOL/L (ref 136–145)
WBC # BLD AUTO: 8.9 K/UL (ref 4.1–11.1)

## 2025-03-23 PROCEDURE — 96372 THER/PROPH/DIAG INJ SC/IM: CPT

## 2025-03-23 PROCEDURE — 6370000000 HC RX 637 (ALT 250 FOR IP): Performed by: EMERGENCY MEDICINE

## 2025-03-23 PROCEDURE — 6370000000 HC RX 637 (ALT 250 FOR IP): Performed by: HOSPITALIST

## 2025-03-23 PROCEDURE — 2500000003 HC RX 250 WO HCPCS: Performed by: HOSPITALIST

## 2025-03-23 PROCEDURE — 2580000003 HC RX 258: Performed by: HOSPITALIST

## 2025-03-23 PROCEDURE — 96376 TX/PRO/DX INJ SAME DRUG ADON: CPT

## 2025-03-23 PROCEDURE — 1200000000 HC SEMI PRIVATE

## 2025-03-23 PROCEDURE — 96375 TX/PRO/DX INJ NEW DRUG ADDON: CPT

## 2025-03-23 PROCEDURE — G0378 HOSPITAL OBSERVATION PER HR: HCPCS

## 2025-03-23 PROCEDURE — 6370000000 HC RX 637 (ALT 250 FOR IP): Performed by: INTERNAL MEDICINE

## 2025-03-23 PROCEDURE — 80053 COMPREHEN METABOLIC PANEL: CPT

## 2025-03-23 PROCEDURE — 85025 COMPLETE CBC W/AUTO DIFF WBC: CPT

## 2025-03-23 PROCEDURE — 6360000002 HC RX W HCPCS: Performed by: HOSPITALIST

## 2025-03-23 RX ORDER — NICOTINE 21 MG/24HR
1 PATCH, TRANSDERMAL 24 HOURS TRANSDERMAL DAILY
Status: DISCONTINUED | OUTPATIENT
Start: 2025-03-23 | End: 2025-03-24 | Stop reason: HOSPADM

## 2025-03-23 RX ORDER — TRAZODONE HYDROCHLORIDE 50 MG/1
50 TABLET ORAL NIGHTLY
Status: DISCONTINUED | OUTPATIENT
Start: 2025-03-23 | End: 2025-03-24 | Stop reason: HOSPADM

## 2025-03-23 RX ORDER — MULTIVITAMIN WITH IRON
1 TABLET ORAL DAILY
Status: DISCONTINUED | OUTPATIENT
Start: 2025-03-23 | End: 2025-03-24 | Stop reason: HOSPADM

## 2025-03-23 RX ADMIN — SODIUM CHLORIDE, PRESERVATIVE FREE 10 ML: 5 INJECTION INTRAVENOUS at 09:06

## 2025-03-23 RX ADMIN — SODIUM CHLORIDE: 0.9 INJECTION, SOLUTION INTRAVENOUS at 15:46

## 2025-03-23 RX ADMIN — NICOTINE POLACRILEX 2 MG: 2 LOZENGE ORAL at 20:15

## 2025-03-23 RX ADMIN — FAMOTIDINE 20 MG: 10 INJECTION, SOLUTION INTRAVENOUS at 20:15

## 2025-03-23 RX ADMIN — THERA TABS 1 TABLET: TAB at 20:15

## 2025-03-23 RX ADMIN — SODIUM CHLORIDE, PRESERVATIVE FREE 10 ML: 5 INJECTION INTRAVENOUS at 20:14

## 2025-03-23 RX ADMIN — NICOTINE POLACRILEX 2 MG: 2 LOZENGE ORAL at 13:51

## 2025-03-23 RX ADMIN — Medication 100 MG: at 09:06

## 2025-03-23 RX ADMIN — FAMOTIDINE 20 MG: 10 INJECTION, SOLUTION INTRAVENOUS at 09:06

## 2025-03-23 RX ADMIN — FOLIC ACID 1 MG: 1 TABLET ORAL at 09:07

## 2025-03-23 RX ADMIN — SODIUM CHLORIDE: 0.9 INJECTION, SOLUTION INTRAVENOUS at 07:02

## 2025-03-23 RX ADMIN — ACETAMINOPHEN 650 MG: 325 TABLET ORAL at 10:55

## 2025-03-23 RX ADMIN — ENOXAPARIN SODIUM 40 MG: 100 INJECTION SUBCUTANEOUS at 09:06

## 2025-03-23 RX ADMIN — ACETAMINOPHEN 650 MG: 325 TABLET ORAL at 20:15

## 2025-03-23 RX ADMIN — LORAZEPAM 1 MG: 1 TABLET ORAL at 20:15

## 2025-03-23 RX ADMIN — TRAZODONE HYDROCHLORIDE 50 MG: 50 TABLET ORAL at 20:15

## 2025-03-23 ASSESSMENT — PAIN DESCRIPTION - DESCRIPTORS
DESCRIPTORS: DISCOMFORT
DESCRIPTORS: ACHING
DESCRIPTORS: DISCOMFORT
DESCRIPTORS: DISCOMFORT

## 2025-03-23 ASSESSMENT — PAIN SCALES - GENERAL
PAINLEVEL_OUTOF10: 0
PAINLEVEL_OUTOF10: 3
PAINLEVEL_OUTOF10: 9
PAINLEVEL_OUTOF10: 0
PAINLEVEL_OUTOF10: 0

## 2025-03-23 ASSESSMENT — PAIN DESCRIPTION - LOCATION
LOCATION: ABDOMEN;BACK
LOCATION: BACK

## 2025-03-23 ASSESSMENT — PAIN DESCRIPTION - ORIENTATION
ORIENTATION: LOWER

## 2025-03-23 ASSESSMENT — PAIN SCALES - WONG BAKER
WONGBAKER_NUMERICALRESPONSE: NO HURT

## 2025-03-23 ASSESSMENT — PAIN - FUNCTIONAL ASSESSMENT: PAIN_FUNCTIONAL_ASSESSMENT: ACTIVITIES ARE NOT PREVENTED

## 2025-03-23 NOTE — ED NOTES
Patient has been instructed that they have been given ativan which contains opioids, benzodiazepines, or other sedating drugs. Patient is aware that they  will need to refrain from driving or operating heavy machinery after taking this medication.  Patient also instructed that they need to avoid drinking alcohol and using other products containing opioids, benzodiazepines, or other sedating drugs.  Patient verbalized understanding.

## 2025-03-23 NOTE — H&P
History and Physical    Date of Service:  3/23/2025  Primary Care Provider: Sujit Yeh MD  Source of information: patient    Chief Complaint: Addiction Problem and Alcohol Problem      History of Presenting Illness:   Jose Higuera, 39 y.o. male with past medical history as documented below presents to the ED with c/o of acute onset of feeling nauseous, anxious and tremulous. Patient notes a history of binge drinking and has been drinking 1/5 of whiskey or a case of beer for the past 2 months. States last drink was 24 hours ago. Denies any other recreational drug use including cocaine. UDS positive for cocaine. Significant labs include ethanol 99 on admission. There was concern overnight about patient having alcohol withdrawal and the nocturnist admitted patient.      REVIEW OF SYSTEMS:  Per HPI     Past Medical History:   Diagnosis Date    Asthma     Chronic kidney disease     left kidny issiue    Hypercholesterolemia     Hypertension       Past Surgical History:   Procedure Laterality Date    ORTHOPEDIC SURGERY      left hand    ND UNLISTED PROCEDURE ABDOMEN PERITONEUM & OMENTUM      exploratory    VASCULAR SURGERY      left leg     Prior to Admission medications    Medication Sig Start Date End Date Taking? Authorizing Provider   cloNIDine (CATAPRES) 0.1 MG tablet  3/14/25   Roxy Montana MD   traZODone (DESYREL) 50 MG tablet  3/14/25   Provider, MD Roxy     Allergies   Allergen Reactions    Nsaids Other (See Comments)     Pt states he is not supposed to have due to kidney injury    Sulfa Antibiotics Rash      No family history on file.   Social History:  reports that he has been smoking cigarettes. He has never used smokeless tobacco. He reports that he does not currently use drugs. He reports that he does not drink alcohol.   Social Drivers of Health     Tobacco Use: High Risk (2/23/2025)    Patient History     Smoking Tobacco Use: Every Day     Smokeless Tobacco Use: Never

## 2025-03-23 NOTE — PLAN OF CARE
Problem: Discharge Planning  Goal: Discharge to home or other facility with appropriate resources  3/23/2025 0916 by Elvira Slade RN  Outcome: Progressing  Flowsheets (Taken 3/23/2025 0733)  Discharge to home or other facility with appropriate resources: Identify barriers to discharge with patient and caregiver  3/23/2025 0013 by Abby Renteria RN  Outcome: Progressing     Problem: Pain  Goal: Verbalizes/displays adequate comfort level or baseline comfort level  3/23/2025 0916 by Elvira Slade RN  Outcome: Progressing  3/23/2025 0013 by Abby Renteria RN  Outcome: Progressing     Problem: Metabolic/Fluid and Electrolytes - Adult  Goal: Electrolytes maintained within normal limits  3/23/2025 0916 by Elvira Slade RN  Outcome: Progressing  Flowsheets (Taken 3/23/2025 0733)  Electrolytes maintained within normal limits: Monitor labs and assess patient for signs and symptoms of electrolyte imbalances  3/23/2025 0013 by Abby Renteria RN  Outcome: Progressing  Goal: Hemodynamic stability and optimal renal function maintained  3/23/2025 0916 by Elvira Slade RN  Outcome: Progressing  3/23/2025 0013 by Abby Renteria RN  Outcome: Progressing  Goal: Glucose maintained within prescribed range  3/23/2025 0916 by Elvira Slade RN  Outcome: Progressing  Flowsheets (Taken 3/23/2025 0733)  Glucose maintained within prescribed range: Assess for signs and symptoms of hyperglycemia and hypoglycemia  3/23/2025 0013 by Abby Renteria RN  Outcome: Progressing

## 2025-03-23 NOTE — PLAN OF CARE
Care plan reviewed   Problem: Discharge Planning  Goal: Discharge to home or other facility with appropriate resources  Outcome: Progressing     Problem: Pain  Goal: Verbalizes/displays adequate comfort level or baseline comfort level  Outcome: Progressing     Problem: Metabolic/Fluid and Electrolytes - Adult  Goal: Electrolytes maintained within normal limits  Outcome: Progressing  Goal: Hemodynamic stability and optimal renal function maintained  Outcome: Progressing  Goal: Glucose maintained within prescribed range  Outcome: Progressing

## 2025-03-23 NOTE — PROGRESS NOTES
[facility] Pharmacy Admission Medication History    Information obtained from: Patient, chart review  RxQuery data available1: Yes    Comments/recommendations:  Spoke to patient at bedside. He seemed a bit drowsy during conversation.   He stated that the recent fills of clonidine and trazodone he is currently not taking but should be taking.   The [place] Prescription Monitoring Program () was accessed to determine fill history of any controlled medications.  No recent data found       1RxQuery pharmacy benefit data reflects medications filled and processed through the patient's insurance, however                this data does NOT capture whether the medication was picked up or is currently being taken by the patient.         Patient allergies:   Allergies as of 03/22/2025 - Fully Reviewed 03/22/2025   Allergen Reaction Noted    Nsaids Other (See Comments) 12/05/2013    Sulfa antibiotics Rash 09/04/2013       Prior to Admission Medications   Prescriptions Last Dose Informant   cloNIDine (CATAPRES) 0.1 MG tablet Not Taking Other, Self   Patient not taking: Reported on 3/22/2025   traZODone (DESYREL) 50 MG tablet Not Taking Other, Self   Patient not taking: Reported on 3/22/2025      Facility-Administered Medications: None              Thank you,  Mamta Cobb [facility]

## 2025-03-23 NOTE — PROGRESS NOTES
Progress  Note    NAME: Jose Higuera   :  1985   MRN:  [id number]     Date/Time:  3/22/2025 11:55 PM    Patient PCP: Sujit Yeh MD  _____________________________________________________________________  Given the patient's current clinical presentation, I have a high level of concern for decompensation if discharged from the emergency department.  Complex decision making was performed, which includes reviewing the patient's available past medical records, laboratory results, and x-ray films.       My assessment of this patient's clinical condition and my plan of care is as follows.    Assessment / Plan:  Pt presents ambulatory to ED complaining of concern for alcohol withdrawal,reports drinking 6 beers to 1 12 pack of beer every day, reports also drinking liquor twice a week. Also Pt reports cocaine use, with his last use being 3 days ago. Pt reports last drink was 15 hours ago. Pt reports mild nausea and itching and numbness in his lower back and bilateral hands.        Alcohol withdrawal syndrome with complication (HCC)   Cocaine use       Admit to Hospitalist service    Tele   Pain control  PRN  Antiemetic PRN  Monitor Lytes  and Cr     Avoid Nephrotoxic  Meds  Adjust meds to GFR      CIWA Protocoal  Thiamine  Folic acid   Advised    substance abuse  cessation      Other Past Medical History :    HTN  Asthma  HLD  PTSD  Recent admision fpr psychosis    per pt      Pharmacy  Consulted  &   Nurses Communication  Ordered for Home  Medication  Reconciliation.Primary hospitalitis Physician team to Follow up with that in am and reconcile home medication       Code Status: Full  DVT Prophylaxis: sq lovenox  GI Prophylaxis: not indicated  Baseline: independent        Subjective:   CHIEF COMPLAINT:  Addiction Problem Alcohol Problem      Pt reports to ED for alcohol withdraw. Pt states has been 24 hrs without alcohol. Pt reports has drank everyday for past 2 month, pt states a fifth of whiskey or case

## 2025-03-23 NOTE — ED NOTES
TRANSFER - OUT REPORT:    Verbal report given to Abby COLON on Jose Higuera  being transferred to PCU 4 for routine progression of patient care       Report consisted of patient's Situation, Background, Assessment and   Recommendations(SBAR).     Information from the following report(s) Nurse Handoff Report, ED Encounter Summary, ED SBAR, Adult Overview, MAR, Recent Results, Neuro Assessment, and Event Log was reviewed with the receiving nurse.    [place] Fall Assessment:    Presents to emergency department  because of falls (Syncope, seizure, or loss of consciousness): No  Age > 70: No  Altered Mental Status, Intoxication with alcohol or substance confusion (Disorientation, impaired judgment, poor safety awaremess, or inability to follow instructions): No  Impaired Mobility: Ambulates or transfers with assistive devices or assistance; Unable to ambulate or transer.: No  Nursing Judgement: No          Lines:   Peripheral IV 03/22/25 Right Antecubital (Active)   Site Assessment Clean, dry & intact 03/22/25 2109   Line Status Brisk blood return 03/22/25 2109   Line Care Connections checked and tightened 03/22/25 2109   Phlebitis Assessment No symptoms 03/22/25 2109   Infiltration Assessment 0 03/22/25 2109   Dressing Status Clean, dry & intact;New dressing applied 03/22/25 2109   Dressing Type Transparent 03/22/25 2109   Dressing Intervention New 03/22/25 2109        Opportunity for questions and clarification was provided.      Patient transported with:  Monitor

## 2025-03-23 NOTE — PROGRESS NOTES
0700.Bedside and verbal shift report given to Elvira RN (oncoming nurse) from ..... Abby  Rn(offgoing nurse). Information discussed includes adult overview, MAR and recent lab results. Patient received ...     Pt continues to take his lead off, pt was educated on the importance of having his heart-rate monitor, Pt up at torres, ambulates to the b/r independently, continues to be lethargic and drowsy during the shift. PRN tylenol given for 8-10 back pain. Pt continues to ask for his personal items in his bag, pt was told by this Rn, that his bag is currently locked up and will need security to go through it with him.  CIWA scores documented per MAR, no new orders noted

## 2025-03-23 NOTE — PROGRESS NOTES
1915: Shift handoff received from ISAIAH Felix. Pt is asleep in bed but awakens easily. He is asking for his bag however the nursing supervisor has notified him security will need to be present for him to retrieve anything from it. Safety measures are in place.    2015: CIWA 9. Pt medicated with 1mg PO Ativan per protocol, as well as tylenol for back pain, nicotine lozenge, and scheduled medications. Pt also given hot packs for back pain.    1234: Dr. JUANA Maldonado notified via ITM Power of pt's HR going into the 40s while he is asleep. Other VSS.    0710: Shift handoff given to ISAIAH Romero. Pt is asleep in bed in NAD.

## 2025-03-23 NOTE — ED PROVIDER NOTES
numbness  Tremor: no tremor  Auditory Disturbances: not present  Paroxysmal Sweats: no sweat visible  Visual Disturbances: not present  Anxiety: 2  Headache, Fullness in Head: none present  Agitation: normal activity  Orientation and Clouding of Sensorium: oriented and can do serial additions  CIWA-Ar Total: 6     [HW]   2145 Lab work resulted, no leukocytosis, no anemia, CMP with normal electrolytes and renal function normal LFTs, alcohol level 99 which is slightly above previous baseline, mag level 1.7. [HW]   2215 CIWA Assessment  Nausea and Vomiting: mild nausea with no vomiting  Tactile Disturbances: mild itching, pins and needles, burning or numbness  Tremor: no tremor  Auditory Disturbances: not present  Paroxysmal Sweats: no sweat visible  Visual Disturbances: not present  Anxiety: mildly anxious  Headache, Fullness in Head: none present  Agitation: normal activity  Orientation and Clouding of Sensorium: oriented and can do serial additions  CIWA-Ar Total: 4   [HW]   Sun Mar 23, 2025   0437 UDS positive for cocaine [HW]      ED Course User Index  [HW] Chapin Mccoy MD      CONSULTS:   IP CONSULT TO SOCIAL WORK  IP CONSULT TO HOSPITALIST  IP CONSULT TO PHARMACY    Consult Note:  Chapin Mccoy MD spoke with Dr. Maldonado  Specialty: Hospitalist  Discussed pt's hx, disposition, and available diagnostic and imaging results. Reviewed care plans. Agree with management and plan thus far. Consultant will evaluate patient for admission.    CRITICAL CARE NOTE :  IMPENDING DETERIORATION -Cardiovascular, CNS, Metabolic, and Renal  ASSOCIATED RISK FACTORS - Hypotension, Dysrhythmia, Metabolic changes, Dehydration, and Vascular Compromise  MANAGEMENT- Bedside Assessment and Supervision of Care  INTERPRETATION -  Xrays, ECG, Blood Pressure, Cardiac Output Measures , and lab work  INTERVENTIONS - hemodynamic mngmt, vascular control, and Metobolic interventions, management of acute alcohol withdrawal syndrome requiring multiple doses

## 2025-03-23 NOTE — ED TRIAGE NOTES
Pt reports to ED for alcohol withdraw.  Pt states has been 24 hrs without alcohol.  Pt reports has drank everyday for past 2 month, pt states a fifth of whiskey or case of beer.  Pt denies ever going through alcohol withdraw.  Pt denies SI/HI and A/V hallucinations.  Pt denies any tremors.  Pt reports mild nausea, mild anxiety.      Pt skin dry and appropriate for ethnicity.

## 2025-03-23 NOTE — ED NOTES
Pt ambulatory to ED restroom with urine cup. Pt seen ambulating in ED hallway, stating \" I'm just walking around checking up on people.\" Pt was redirected to his room.

## 2025-03-23 NOTE — ED NOTES
Pt presents ambulatory to ED complaining of concern for alcohol withdrawal. Pt reports drinking 6 beers to 1 12 pack of beer every day. Pt reports also drinking liquor twice a week. Pt reports cocaine use, with his last use being 3 days ago. Pt reports last drink was 15 hours ago. Pt reports mild nausea and itching and numbness in his lower back and bilateral hands. Pt is alert and oriented x 4, RR even and unlabored, skin is warm and dry. Assesment completed and pt updated on plan of care.       Emergency Department Nursing Plan of Care       The Nursing Plan of Care is developed from the Nursing assessment and Emergency Department Attending provider initial evaluation.  The plan of care may be reviewed in the “ED Provider note”.    The Plan of Care was developed with the following considerations:   Patient / Family readiness to learn indicated by:verbalized understanding  Persons(s) to be included in education: patient  Barriers to Learning/Limitations:None    Signed     Nancy Rosario RN    3/22/2025   9:06 PM

## 2025-03-24 ENCOUNTER — HOSPITAL ENCOUNTER (EMERGENCY)
Facility: HOSPITAL | Age: 40
Discharge: LEFT BEFORE DISCHARGE | End: 2025-03-24
Attending: EMERGENCY MEDICINE
Payer: COMMERCIAL

## 2025-03-24 VITALS
BODY MASS INDEX: 21.07 KG/M2 | DIASTOLIC BLOOD PRESSURE: 93 MMHG | TEMPERATURE: 97.9 F | RESPIRATION RATE: 14 BRPM | SYSTOLIC BLOOD PRESSURE: 133 MMHG | OXYGEN SATURATION: 95 % | HEIGHT: 73 IN | HEART RATE: 65 BPM | WEIGHT: 159 LBS

## 2025-03-24 VITALS
OXYGEN SATURATION: 100 % | HEART RATE: 96 BPM | DIASTOLIC BLOOD PRESSURE: 91 MMHG | HEIGHT: 73 IN | BODY MASS INDEX: 21.42 KG/M2 | RESPIRATION RATE: 16 BRPM | TEMPERATURE: 98.4 F | WEIGHT: 161.6 LBS | SYSTOLIC BLOOD PRESSURE: 152 MMHG

## 2025-03-24 DIAGNOSIS — F14.10 COCAINE USE DISORDER: ICD-10-CM

## 2025-03-24 DIAGNOSIS — F10.90 ALCOHOL USE DISORDER: ICD-10-CM

## 2025-03-24 DIAGNOSIS — Z53.29 LEFT AGAINST MEDICAL ADVICE: Primary | ICD-10-CM

## 2025-03-24 LAB
-: NORMAL
ALBUMIN SERPL-MCNC: 2.4 G/DL (ref 3.5–5)
ALBUMIN SERPL-MCNC: 3.3 G/DL (ref 3.5–5)
ALBUMIN/GLOB SERPL: 0.9 (ref 1.1–2.2)
ALBUMIN/GLOB SERPL: 1.1 (ref 1.1–2.2)
ALP SERPL-CCNC: 81 U/L (ref 45–117)
ALP SERPL-CCNC: 82 U/L (ref 45–117)
ALT SERPL-CCNC: 20 U/L (ref 12–78)
ALT SERPL-CCNC: 21 U/L (ref 12–78)
ANION GAP SERPL CALC-SCNC: 3 MMOL/L (ref 2–12)
ANION GAP SERPL CALC-SCNC: 6 MMOL/L (ref 2–12)
AST SERPL-CCNC: 15 U/L (ref 15–37)
AST SERPL-CCNC: 15 U/L (ref 15–37)
BASOPHILS # BLD: 0.05 K/UL (ref 0–0.1)
BASOPHILS # BLD: 0.07 K/UL (ref 0–0.1)
BASOPHILS NFR BLD: 0.7 % (ref 0–1)
BASOPHILS NFR BLD: 0.8 % (ref 0–1)
BILIRUB SERPL-MCNC: 0.2 MG/DL (ref 0.2–1)
BILIRUB SERPL-MCNC: 0.2 MG/DL (ref 0.2–1)
BUN SERPL-MCNC: 10 MG/DL (ref 6–20)
BUN SERPL-MCNC: 13 MG/DL (ref 6–20)
BUN/CREAT SERPL: 11 (ref 12–20)
BUN/CREAT SERPL: 11 (ref 12–20)
CALCIUM SERPL-MCNC: 8.1 MG/DL (ref 8.5–10.1)
CALCIUM SERPL-MCNC: 8.6 MG/DL (ref 8.5–10.1)
CHLORIDE SERPL-SCNC: 111 MMOL/L (ref 97–108)
CHLORIDE SERPL-SCNC: 114 MMOL/L (ref 97–108)
CO2 SERPL-SCNC: 27 MMOL/L (ref 21–32)
CO2 SERPL-SCNC: 28 MMOL/L (ref 21–32)
COMMENT:: NORMAL
CREAT SERPL-MCNC: 0.89 MG/DL (ref 0.7–1.3)
CREAT SERPL-MCNC: 1.22 MG/DL (ref 0.7–1.3)
DIFFERENTIAL METHOD BLD: ABNORMAL
DIFFERENTIAL METHOD BLD: ABNORMAL
EKG ATRIAL RATE: 116 BPM
EKG DIAGNOSIS: NORMAL
EKG P AXIS: 69 DEGREES
EKG P-R INTERVAL: 126 MS
EKG Q-T INTERVAL: 324 MS
EKG QRS DURATION: 92 MS
EKG QTC CALCULATION (BAZETT): 450 MS
EKG R AXIS: 89 DEGREES
EKG T AXIS: 47 DEGREES
EKG VENTRICULAR RATE: 116 BPM
EOSINOPHIL # BLD: 0.05 K/UL (ref 0–0.4)
EOSINOPHIL # BLD: 0.33 K/UL (ref 0–0.4)
EOSINOPHIL NFR BLD: 0.5 % (ref 0–7)
EOSINOPHIL NFR BLD: 5.2 % (ref 0–7)
ERYTHROCYTE [DISTWIDTH] IN BLOOD BY AUTOMATED COUNT: 16.3 % (ref 11.5–14.5)
ERYTHROCYTE [DISTWIDTH] IN BLOOD BY AUTOMATED COUNT: 16.6 % (ref 11.5–14.5)
ETHANOL SERPL-MCNC: 137 MG/DL (ref 0–0.08)
GLOBULIN SER CALC-MCNC: 2.8 G/DL (ref 2–4)
GLOBULIN SER CALC-MCNC: 3 G/DL (ref 2–4)
GLUCOSE SERPL-MCNC: 84 MG/DL (ref 65–100)
GLUCOSE SERPL-MCNC: 96 MG/DL (ref 65–100)
HAV IGM SER QL: NONREACTIVE
HBV CORE IGM SER QL: NONREACTIVE
HBV SURFACE AG SER QL: <0.1 INDEX
HBV SURFACE AG SER QL: NEGATIVE
HCT VFR BLD AUTO: 43.3 % (ref 36.6–50.3)
HCT VFR BLD AUTO: 44.8 % (ref 36.6–50.3)
HCV AB SER IA-ACNC: 0.12 INDEX
HCV AB SERPL QL IA: NONREACTIVE
HGB BLD-MCNC: 13.7 G/DL (ref 12.1–17)
HGB BLD-MCNC: 14.1 G/DL (ref 12.1–17)
IMM GRANULOCYTES # BLD AUTO: 0.01 K/UL (ref 0–0.04)
IMM GRANULOCYTES # BLD AUTO: 0.03 K/UL (ref 0–0.04)
IMM GRANULOCYTES NFR BLD AUTO: 0.2 % (ref 0–0.5)
IMM GRANULOCYTES NFR BLD AUTO: 0.3 % (ref 0–0.5)
LYMPHOCYTES # BLD: 1.01 K/UL (ref 0.8–3.5)
LYMPHOCYTES # BLD: 2.12 K/UL (ref 0.8–3.5)
LYMPHOCYTES NFR BLD: 33.3 % (ref 12–49)
LYMPHOCYTES NFR BLD: 9.4 % (ref 12–49)
MAGNESIUM SERPL-MCNC: 1.7 MG/DL (ref 1.6–2.4)
MCH RBC QN AUTO: 28.7 PG (ref 26–34)
MCH RBC QN AUTO: 29.1 PG (ref 26–34)
MCHC RBC AUTO-ENTMCNC: 31.5 G/DL (ref 30–36.5)
MCHC RBC AUTO-ENTMCNC: 31.6 G/DL (ref 30–36.5)
MCV RBC AUTO: 90.8 FL (ref 80–99)
MCV RBC AUTO: 92.6 FL (ref 80–99)
MONOCYTES # BLD: 0.6 K/UL (ref 0–1)
MONOCYTES # BLD: 0.64 K/UL (ref 0–1)
MONOCYTES NFR BLD: 10.1 % (ref 5–13)
MONOCYTES NFR BLD: 5.6 % (ref 5–13)
NEUTS SEG # BLD: 3.21 K/UL (ref 1.8–8)
NEUTS SEG # BLD: 8.93 K/UL (ref 1.8–8)
NEUTS SEG NFR BLD: 50.4 % (ref 32–75)
NEUTS SEG NFR BLD: 83.5 % (ref 32–75)
NRBC # BLD: 0 K/UL (ref 0–0.01)
NRBC # BLD: 0 K/UL (ref 0–0.01)
NRBC BLD-RTO: 0 PER 100 WBC
NRBC BLD-RTO: 0 PER 100 WBC
PHOSPHATE SERPL-MCNC: 4.2 MG/DL (ref 2.6–4.7)
PLATELET # BLD AUTO: 229 K/UL (ref 150–400)
PLATELET # BLD AUTO: 260 K/UL (ref 150–400)
PMV BLD AUTO: 10.9 FL (ref 8.9–12.9)
PMV BLD AUTO: 11.1 FL (ref 8.9–12.9)
POTASSIUM SERPL-SCNC: 3.9 MMOL/L (ref 3.5–5.1)
POTASSIUM SERPL-SCNC: 4 MMOL/L (ref 3.5–5.1)
PROT SERPL-MCNC: 5.2 G/DL (ref 6.4–8.2)
PROT SERPL-MCNC: 6.3 G/DL (ref 6.4–8.2)
RBC # BLD AUTO: 4.77 M/UL (ref 4.1–5.7)
RBC # BLD AUTO: 4.84 M/UL (ref 4.1–5.7)
SODIUM SERPL-SCNC: 144 MMOL/L (ref 136–145)
SODIUM SERPL-SCNC: 145 MMOL/L (ref 136–145)
SPECIMEN HOLD: NORMAL
WBC # BLD AUTO: 10.7 K/UL (ref 4.1–11.1)
WBC # BLD AUTO: 6.4 K/UL (ref 4.1–11.1)

## 2025-03-24 PROCEDURE — 6360000002 HC RX W HCPCS: Performed by: HOSPITALIST

## 2025-03-24 PROCEDURE — 85025 COMPLETE CBC W/AUTO DIFF WBC: CPT

## 2025-03-24 PROCEDURE — 2500000003 HC RX 250 WO HCPCS: Performed by: HOSPITALIST

## 2025-03-24 PROCEDURE — 80053 COMPREHEN METABOLIC PANEL: CPT

## 2025-03-24 PROCEDURE — 6370000000 HC RX 637 (ALT 250 FOR IP): Performed by: EMERGENCY MEDICINE

## 2025-03-24 PROCEDURE — 99284 EMERGENCY DEPT VISIT MOD MDM: CPT

## 2025-03-24 PROCEDURE — 6370000000 HC RX 637 (ALT 250 FOR IP): Performed by: INTERNAL MEDICINE

## 2025-03-24 PROCEDURE — 96376 TX/PRO/DX INJ SAME DRUG ADON: CPT

## 2025-03-24 PROCEDURE — 84100 ASSAY OF PHOSPHORUS: CPT

## 2025-03-24 PROCEDURE — 80074 ACUTE HEPATITIS PANEL: CPT

## 2025-03-24 PROCEDURE — 82077 ASSAY SPEC XCP UR&BREATH IA: CPT

## 2025-03-24 PROCEDURE — 93010 ELECTROCARDIOGRAM REPORT: CPT | Performed by: SPECIALIST

## 2025-03-24 PROCEDURE — G0378 HOSPITAL OBSERVATION PER HR: HCPCS

## 2025-03-24 PROCEDURE — 93005 ELECTROCARDIOGRAM TRACING: CPT | Performed by: EMERGENCY MEDICINE

## 2025-03-24 PROCEDURE — 83735 ASSAY OF MAGNESIUM: CPT

## 2025-03-24 PROCEDURE — 2580000003 HC RX 258: Performed by: HOSPITALIST

## 2025-03-24 PROCEDURE — 36415 COLL VENOUS BLD VENIPUNCTURE: CPT

## 2025-03-24 PROCEDURE — 96372 THER/PROPH/DIAG INJ SC/IM: CPT

## 2025-03-24 RX ORDER — FOLIC ACID 1 MG/1
1 TABLET ORAL DAILY
Qty: 30 TABLET | Refills: 0 | Status: SHIPPED | OUTPATIENT
Start: 2025-03-25

## 2025-03-24 RX ORDER — NICOTINE 21 MG/24HR
1 PATCH, TRANSDERMAL 24 HOURS TRANSDERMAL DAILY
Qty: 30 PATCH | Refills: 0 | Status: SHIPPED | OUTPATIENT
Start: 2025-03-25

## 2025-03-24 RX ORDER — LANOLIN ALCOHOL/MO/W.PET/CERES
100 CREAM (GRAM) TOPICAL DAILY
Qty: 30 TABLET | Refills: 0 | Status: SHIPPED | OUTPATIENT
Start: 2025-03-25

## 2025-03-24 RX ORDER — MULTIVITAMIN WITH IRON
1 TABLET ORAL DAILY
Qty: 30 TABLET | Refills: 0 | Status: SHIPPED | OUTPATIENT
Start: 2025-03-25

## 2025-03-24 RX ADMIN — THERA TABS 1 TABLET: TAB at 07:53

## 2025-03-24 RX ADMIN — FAMOTIDINE 20 MG: 10 INJECTION, SOLUTION INTRAVENOUS at 07:53

## 2025-03-24 RX ADMIN — FOLIC ACID 1 MG: 1 TABLET ORAL at 07:53

## 2025-03-24 RX ADMIN — Medication 100 MG: at 07:53

## 2025-03-24 RX ADMIN — ENOXAPARIN SODIUM 40 MG: 100 INJECTION SUBCUTANEOUS at 07:53

## 2025-03-24 ASSESSMENT — PAIN DESCRIPTION - LOCATION: LOCATION: BACK

## 2025-03-24 ASSESSMENT — LIFESTYLE VARIABLES
HOW OFTEN DO YOU HAVE A DRINK CONTAINING ALCOHOL: 4 OR MORE TIMES A WEEK
HOW MANY STANDARD DRINKS CONTAINING ALCOHOL DO YOU HAVE ON A TYPICAL DAY: 3 OR 4

## 2025-03-24 ASSESSMENT — PAIN SCALES - GENERAL
PAINLEVEL_OUTOF10: 5
PAINLEVEL_OUTOF10: 9
PAINLEVEL_OUTOF10: 0

## 2025-03-24 NOTE — CARE COORDINATION
Care Management Initial Assessment       RUR:12%  Readmission? Yes   1st IM letter given? No  1st  letter given: No    PATIENT ADMITTED  WITH ALCOHOL WITHDRAWAL DELIRIUM     CM MET WITH PATIENT AT BEDSIDE TO INTRODUCE SELF, EXPLAIN ROLE AND COMPLETE INITIAL ASSESSMENT.     PATIENT STATED HE WANTED TO USE THE ADDRESS ON FILE AS HE STAYS FROM PLACE TO PLACE. HE STATED HE DID NOT KNOW TO REQUEST TRANSPORTATION FROM HIS MEDICAID PROVIDER.  HE STATED HE WILL SEE HER AND ARRANGE TRANSPORTATION TO APPOINTMENTS.  INFORMED HM CM WILL PUT MEDICAID TRANSPORT INFORMATION ON THE PHONE.    HE DID SAY HE WILL  REVIEW INFORMATION PLACED ON HIS CHART.    PCP APPOINTMENT SCHEDULED          03/24/25 1038   Service Assessment   Patient Orientation Alert and Oriented;Person;Place;Situation;Self   Cognition Alert   History Provided By Patient   Primary Caregiver Self   Accompanied By/Relationship N/A   Support Systems Parent   Patient's Healthcare Decision Maker is: Legal Next of Kin   PCP Verified by CM Yes   Last Visit to PCP Within last two years  (SANDEEIEN STATED IT HAS BEEN A LITTLE WHILE THINKS IT HAS BEEN WITHIN THE LAST TWO YEARS DUE TO TRANSPORTATION)   Prior Functional Level Independent in ADLs/IADLs   Current Functional Level Independent in ADLs/IADLs   Can patient return to prior living arrangement Other (see comment)  (UNKNOW WOULD LIKE HOUSING RESOURCES)   Ability to make needs known: Fair   Family able to assist with home care needs: No   Would you like for me to discuss the discharge plan with any other family members/significant others, and if so, who? Yes  (CAN SPEAK TO MOM BUT SHE CANT MAKE ADMITTING DECISIONS)   Financial Resources Medicaid   Discharge Planning   Type of Residence Other (Comment)  (AT A FRIENDS NOT SURE WHICH ONE,)   Living Arrangements Alone   Current Services Prior To Admission None   Services At/After Discharge   [place] Resource Information Provided? No   Condition of Participation: Discharge

## 2025-03-24 NOTE — PROGRESS NOTES
Patients mother Meenakshi Clay called and asked that this patient be seen by psychiatric  services.  She states she has been trying to get him admitted to a psych hospital but has not been successful.  She states he is responding to enteral stimuli.  She states he hears voices and see's things that are not there.  She states she just got off the phone with him and he said he wants to leave.  She also requested that I ask his permission to get information on his medical condition.   I went to his room and told him that she had called and is asking about his medical condition.  I asked him for permission to speak with her regarding his medical condition an treatment and he said he does NOT want to me to discuss his medical condition with her at this time.    She asked me to put her number in his chart in the event she is needed for anything. [phone].

## 2025-03-24 NOTE — DISCHARGE INSTRUCTIONS
Dear Jadyn Kalen,    You were admitted to [facility] due to episodes of nausea and anxiousness. Based on your history of alcohol use and drinking patterns, there was concern for possible alcohol withdrawal. To ensure your safety and effectively manage your symptoms, you were admitted to the hospital for continuous monitoring.Your condition has significantly improved, and you are now stable for discharge. We recommend following up with a primary care clinician within seven days of discharge and seeking support from a drug rehabilitation facility. Additionally, we encourage you to consider consulting with a psychiatrist or therapist.    Thank you for choosing [facility] for your care.

## 2025-03-24 NOTE — CARE COORDINATION
[facility]IL[facility]  IDR NOTE AND FOLLOW-UP     RUR 12 %     IDR Rounds this am with MD and team.   Psych Consult  - see note   CARLOS later today- needs PCP , Mental Health.  Recovery Resources, housing Resources.     Asked to assist with PCP appointment.     Called the PCP office      Sujit Yeh MD PCP - General Internal Medicine [phone] [facility]  (prior to 6/17/2023) [address]  [address] [facility] [address]      Next Steps: Follow up  Instructions: Hospital follow-up to see your Doctor  is April 7, 2025 @ 11:50AM  Please keep this appointment.   Call the office if you need to change       Rose ELMORE RN   [phone]

## 2025-03-24 NOTE — PLAN OF CARE
Problem: Discharge Planning  Goal: Discharge to home or other facility with appropriate resources  3/24/2025 0958 by Nick Valle RN  Outcome: Progressing  3/23/2025 2116 by Fifi Campbell RN  Outcome: Progressing     Problem: Pain  Goal: Verbalizes/displays adequate comfort level or baseline comfort level  3/24/2025 0958 by Nick Valle RN  Outcome: Progressing  3/23/2025 2116 by Fifi Campbell RN  Outcome: Progressing     Problem: Metabolic/Fluid and Electrolytes - Adult  Goal: Electrolytes maintained within normal limits  3/24/2025 0958 by Nick Valle RN  Outcome: Progressing  3/23/2025 2116 by Fifi Campbell RN  Outcome: Progressing  Goal: Hemodynamic stability and optimal renal function maintained  3/24/2025 0958 by Nick Valle RN  Outcome: Progressing  3/23/2025 2116 by Fifi Campbell RN  Outcome: Progressing  Goal: Glucose maintained within prescribed range  3/24/2025 0958 by Nick Valle RN  Outcome: Progressing  3/23/2025 2116 by Fifi Campbell RN  Outcome: Progressing     Problem: Cardiovascular - Adult  Goal: Maintains optimal cardiac output and hemodynamic stability  3/24/2025 0958 by Nick Valle RN  Outcome: Progressing  3/23/2025 2116 by Fifi Campbell RN  Outcome: Progressing     Problem: Safety - Adult  Goal: Free from fall injury  Outcome: Progressing

## 2025-03-24 NOTE — CARE COORDINATION
Care Management Initial Assessment       RUR:12%  Readmission? Yes   1st IM letter given? No  1st  letter given: No      PATIENT ADMITTED  WITH ALCOHOL WITHDRAWAL DELIRIUM    CM MET WITH PATIENT AT BEDSIDE TO INTRODUCE SELF, EXPLAIN ROLE AND COMPLETE INITIAL ASSESSMENT.    PATIENT STATED HE WANTED TO USE THE ADDRESS ON FILE AS HE STAYS FROM PLACE TO PLACE. HE STATED HE DID NOT KNOW TO REQUEST TRANSPORTATION FROM HIS MEDICAID PROVIDER.  HE STATED HE WILL SEE HER        03/24/25 1038   Service Assessment   Patient Orientation Alert and Oriented;Person;Place;Situation;Self   Cognition Alert   History Provided By Patient   Primary Caregiver Self   Accompanied By/Relationship N/A   Support Systems Parent   Patient's Healthcare Decision Maker is: Legal Next of Kin   PCP Verified by CM Yes   Last Visit to PCP Within last two years  (ASUNCION STATED IT HAS BEEN A LITTLE WHILE THINKS IT HAS BEEN WITHIN THE LAST TWO YEARS DUE TO TRANSPORTATION)   Prior Functional Level Independent in ADLs/IADLs   Current Functional Level Independent in ADLs/IADLs   Can patient return to prior living arrangement Other (see comment)  (UNKNOW WOULD LIKE HOUSING RESOURCES)   Would you like for me to discuss the discharge plan with any other family members/significant others, and if so, who? Yes  (CAN SPEAK TO MOM BUT SHE CANT MAKE ADMITTING DECISIONS)   Financial Resources Medicaid   Discharge Planning   Type of Residence Other (Comment)  (AT A FRIENDS NOT SURE WHICH ONE,)   Living Arrangements Alone   Current Services Prior To Admission None   Services At/After Discharge    Resource Information Provided? No   Condition of Participation: Discharge Planning   The Plan for Transition of Care is related to the following treatment goals: PCP, SUBSTANCE RESOURCES   The Patient and/or Patient Representative was provided with a Choice of Provider? Patient   The Patient and/Or Patient Representative agree with the Discharge Plan? Yes
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
## 2025-03-24 NOTE — ED TRIAGE NOTES
Patient arrives to the ED for complaints of alcohol intoxication. Patient was admitted to [facility] and discharged today. Patient was discharged from the hospital and began drinking today. Last drink 1 hour ago. Patient endorses cocaine use 3 days ago.

## 2025-03-24 NOTE — CARE COORDINATION
03/24/25 1104   Readmission Assessment   Number of Days since last admission? 8-30 days   Previous Disposition Other (comment)  (PATIENT STATED HE STAYED WHERE HE COULD)   Who is being Interviewed Patient   What was the patient's/caregiver's perception as to why they think they needed to return back to the hospital? AMA discharge on prior admission   Did you visit your Primary Care Physician after you left the hospital, before you returned this time? No   Why weren't you able to visit your PCP? Had no transportation  (INFORMED PATIENT TO TRY MEDICAID TRANSPORT)   Did you see a specialist, such as Cardiac, Pulmonary, Orthopedic Physician, etc. after you left the hospital? No   Who advised the patient to return to the hospital? Self-referral   Does the patient report anything that got in the way of taking their medications? No   In our efforts to provide the best possible care to you and others like you, can you think of anything that we could have done to help you after you left the hospital the first time, so that you might not have needed to return so soon? Other (Comment)  (NO)     CORETTA CASTRO RN CM

## 2025-03-24 NOTE — DISCHARGE SUMMARY
Discharge Summary   Please note that this dictation was completed with [employer], the computer voice recognition software.  Quite often unanticipated grammatical, syntax, homophones, and other interpretive errors are inadvertently transcribed by the computer software.  Please disregard these errors.  Please excuse any errors that have escaped final proofreading.    PATIENT ID: Jose Higuera  MRN: [id number]   YOB: 1985    DATE OF ADMISSION: 3/22/2025  8:42 PM    DATE OF DISCHARGE: 3/24/2025  PRIMARY CARE PROVIDER: Sujit Yeh MD         ATTENDING PHYSICIAN: Campos Mott MD  DISCHARGING PROVIDER: Campos Mott MD       CONSULTATIONS: IP CONSULT TO SOCIAL WORK  IP CONSULT TO HOSPITALIST  IP CONSULT TO PHARMACY  IP CONSULT TO PSYCHIATRY    PROCEDURES/SURGERIES: * No surgery found *    ADMITTING HPI from excerpted H&P   Jose Higuera, 39 y.o. male with past medical history as documented below presents to the ED with c/o of acute onset of feeling nauseous, anxious and tremulous. Patient notes a history of binge drinking and has been drinking 1/5 of whiskey or a case of beer for the past 2 months. States last drink was 24 hours ago. Denies any other recreational drug use including cocaine. UDS positive for cocaine. Significant labs include ethanol 99 on admission. There was concern overnight about patient having alcohol withdrawal and the nocturnist admitted patient.         HOSPITAL COURSE & DISCHARGE DIAGNOSIS/ PLAN:     Alcohol use disorder (improving)  -last drink day of admission, drinks a case of beer a day or 1/5 of whiskey bottle   -no hx of seizures  -serology Hep B, C negative  -CIWA score 0  -c/w Multivitamins, Thiamine, Folic acid  -03/24: Ciwa now a 0, patient has no complaints, refused addiction medicine consult however agreed to see psych     Substance use disorder  -uds positive for cocaine  -patient denies usage, unable to have a discussion abstinence from recreational meds like

## 2025-03-25 LAB
EKG ATRIAL RATE: 87 BPM
EKG DIAGNOSIS: NORMAL
EKG P AXIS: 76 DEGREES
EKG P-R INTERVAL: 126 MS
EKG Q-T INTERVAL: 364 MS
EKG QRS DURATION: 90 MS
EKG QTC CALCULATION (BAZETT): 438 MS
EKG R AXIS: 72 DEGREES
EKG T AXIS: 72 DEGREES
EKG VENTRICULAR RATE: 87 BPM

## 2025-03-25 NOTE — ED PROVIDER NOTES
[facility] EMERGENCY DEPARTMENT  EMERGENCY DEPARTMENT ENCOUNTER      Pt Name: Jose Higuera  MRN: [id number]  Birthdate 1985  Date of evaluation: 3/24/2025  Provider: Kye Marte MD    CHIEF COMPLAINT       Chief Complaint   Patient presents with    Alcohol Problem       ALLERGIES     Nsaids and Sulfa antibiotics    ENCOUNTER     HISTORY OF PRESENT ILLNESS:    A 39-year-old male with a past medical history significant for alcohol use disorder, cocaine use disorder, asthma, schizophrenia, and nicotine use disorder presented to the Emergency Department. The history was provided by the patient himself. He was recently discharged after a 3-day stay at [facility] for alcohol withdrawal, where he experienced nausea, anxiety, and tremors. He was discharged with multivitamins, thiamine, and folate. The patient reports feeling sober and not experiencing any withdrawal symptoms currently. He presented today with complaints of alcohol intoxication, having consumed alcohol with his last drink being one hour prior to arrival. He also reports cocaine use three days ago. The patient expressed a desire not to return to [facility] today.    PAST MEDICAL HISTORY:    - Alcohol use disorder    - Cocaine use disorder    - Asthma    - Schizophrenia    - Nicotine use disorder      SOCIAL HISTORY:    - Reports drinking alcohol today, last drink 1 hour prior to arrival    - Cocaine use 3 days ago      PHYSICAL EXAM:    General Appearance: Alert and oriented, not in acute distress.    Skin: No jaundice observed. Partial amputations of fingers on the left hand with well-approximated stumps, no signs of infection.    HEENT: Normocephalic, atraumatic.    Neck: Supple, no lymphadenopathy.    Respiratory: Breath sounds clear bilaterally, no wheezing or respiratory distress.    Cardiovascular: Regular rate and rhythm, no murmurs, rubs, or gallops.    Abdomen: Soft, non-tender, no distension.

## 2025-03-25 NOTE — ED NOTES
Went to waiting room to escort pt to ED room 22. Pt stated he has called a ride and will be leaving. Pt stated a staff member already removed his PIV. This RN inspected pt's right arm, no IV noted, and gauze secured with tagaderm was noted to be in place.     Updated Charge RN, Laith Barakat and Dr Greenfield who stated he would speak with pt. Pt cooperative to come to RRD to await MD. RAMOS paper work left at chair side for MD.

## 2025-03-26 ENCOUNTER — TELEPHONE (OUTPATIENT)
Facility: HOSPITAL | Age: 40
End: 2025-03-26

## 2025-03-26 NOTE — TELEPHONE ENCOUNTER
LASHAE called patient by telephone to perform post discharge assessment and for the purpose of follow up call from inpatient discharge to check on environmental challenges/medications/appointment follow up/and questions/concerns.     The call was answered by patient/family/caretaker/agency, introduction self, and explanation and reason for call, name and  confirmed.     Patient states everything is good.     Kevin Timmons CM   [phone]

## 2025-03-27 ENCOUNTER — TELEPHONE (OUTPATIENT)
Age: 40
End: 2025-03-27

## 2025-03-27 NOTE — TELEPHONE ENCOUNTER
Care Transitions Initial Follow Up Call    Outreach made within 2 business days of discharge: Yes    Patient: Jose Higuera Patient : 1985   MRN: [id number]  Reason for Admission: Alcohol withdrawal delirium  Discharge Date: 3/24/25       Spoke with: patient    Discharge department/facility: [facility] Interactive Patient Contact:  Was patient able to fill all prescriptions: Yes  Was patient instructed to bring all medications to the follow-up visit: Yes  Is patient taking all medications as directed in the discharge summary? Yes  Does patient understand their discharge instructions: Yes  Does patient have questions or concerns that need addressed prior to 7-14 day follow up office visit: no    Additional needs identified to be addressed with provider  No needs identified             Scheduled appointment with PCP within 7-14 days    Follow Up  Future Appointments   Date Time Provider Department Center   2025 11:50 AM Sujit Yeh MD [facility] ECC DEP       Anita Mcpherson

## 2025-03-28 VITALS
TEMPERATURE: 97.7 F | DIASTOLIC BLOOD PRESSURE: 82 MMHG | WEIGHT: 156.97 LBS | HEART RATE: 85 BPM | HEIGHT: 73 IN | SYSTOLIC BLOOD PRESSURE: 142 MMHG | BODY MASS INDEX: 20.8 KG/M2 | RESPIRATION RATE: 18 BRPM | OXYGEN SATURATION: 93 %

## 2025-03-28 LAB
BASOPHILS # BLD: 0.08 K/UL (ref 0–0.1)
BASOPHILS NFR BLD: 1 % (ref 0–1)
COMMENT:: NORMAL
DIFFERENTIAL METHOD BLD: ABNORMAL
EOSINOPHIL # BLD: 0.31 K/UL (ref 0–0.4)
EOSINOPHIL NFR BLD: 3.9 % (ref 0–7)
ERYTHROCYTE [DISTWIDTH] IN BLOOD BY AUTOMATED COUNT: 15.7 % (ref 11.5–14.5)
HCT VFR BLD AUTO: 45.7 % (ref 36.6–50.3)
HGB BLD-MCNC: 15 G/DL (ref 12.1–17)
IMM GRANULOCYTES # BLD AUTO: 0.02 K/UL (ref 0–0.04)
IMM GRANULOCYTES NFR BLD AUTO: 0.3 % (ref 0–0.5)
LYMPHOCYTES # BLD: 1.5 K/UL (ref 0.8–3.5)
LYMPHOCYTES NFR BLD: 18.8 % (ref 12–49)
MCH RBC QN AUTO: 29.4 PG (ref 26–34)
MCHC RBC AUTO-ENTMCNC: 32.8 G/DL (ref 30–36.5)
MCV RBC AUTO: 89.6 FL (ref 80–99)
MONOCYTES # BLD: 0.88 K/UL (ref 0–1)
MONOCYTES NFR BLD: 11.1 % (ref 5–13)
NEUTS SEG # BLD: 5.17 K/UL (ref 1.8–8)
NEUTS SEG NFR BLD: 64.9 % (ref 32–75)
NRBC # BLD: 0 K/UL (ref 0–0.01)
NRBC BLD-RTO: 0 PER 100 WBC
PLATELET # BLD AUTO: 285 K/UL (ref 150–400)
PMV BLD AUTO: 10.7 FL (ref 8.9–12.9)
RBC # BLD AUTO: 5.1 M/UL (ref 4.1–5.7)
SPECIMEN HOLD: NORMAL
WBC # BLD AUTO: 8 K/UL (ref 4.1–11.1)

## 2025-03-28 PROCEDURE — 99283 EMERGENCY DEPT VISIT LOW MDM: CPT

## 2025-03-28 PROCEDURE — 85025 COMPLETE CBC W/AUTO DIFF WBC: CPT

## 2025-03-28 PROCEDURE — 80048 BASIC METABOLIC PNL TOTAL CA: CPT

## 2025-03-28 PROCEDURE — 82077 ASSAY SPEC XCP UR&BREATH IA: CPT

## 2025-03-28 PROCEDURE — 84100 ASSAY OF PHOSPHORUS: CPT

## 2025-03-28 PROCEDURE — 80076 HEPATIC FUNCTION PANEL: CPT

## 2025-03-28 PROCEDURE — 83735 ASSAY OF MAGNESIUM: CPT

## 2025-03-28 ASSESSMENT — PAIN - FUNCTIONAL ASSESSMENT: PAIN_FUNCTIONAL_ASSESSMENT: NONE - DENIES PAIN

## 2025-03-29 ENCOUNTER — HOSPITAL ENCOUNTER (EMERGENCY)
Facility: HOSPITAL | Age: 40
Discharge: HOME | End: 2025-03-29
Attending: STUDENT IN AN ORGANIZED HEALTH CARE EDUCATION/TRAINING PROGRAM
Payer: COMMERCIAL

## 2025-03-29 ENCOUNTER — HOSPITAL ENCOUNTER (EMERGENCY)
Facility: HOSPITAL | Age: 40
Discharge: HOME | End: 2025-03-29
Attending: EMERGENCY MEDICINE
Payer: COMMERCIAL

## 2025-03-29 VITALS
SYSTOLIC BLOOD PRESSURE: 159 MMHG | TEMPERATURE: 98.1 F | RESPIRATION RATE: 18 BRPM | DIASTOLIC BLOOD PRESSURE: 91 MMHG | HEART RATE: 66 BPM | OXYGEN SATURATION: 100 %

## 2025-03-29 DIAGNOSIS — F20.9 CHRONIC SCHIZOPHRENIA (HCC): ICD-10-CM

## 2025-03-29 DIAGNOSIS — F19.90 POLYSUBSTANCE USE DISORDER: Primary | ICD-10-CM

## 2025-03-29 DIAGNOSIS — Z59.00 HOMELESS: ICD-10-CM

## 2025-03-29 DIAGNOSIS — F19.10 SUBSTANCE ABUSE: Primary | ICD-10-CM

## 2025-03-29 LAB
ALBUMIN SERPL-MCNC: 3.4 G/DL (ref 3.5–5)
ALBUMIN SERPL-MCNC: 3.4 G/DL (ref 3.5–5)
ALBUMIN/GLOB SERPL: 1.2 (ref 1.1–2.2)
ALBUMIN/GLOB SERPL: 1.2 (ref 1.1–2.2)
ALP SERPL-CCNC: 82 U/L (ref 45–117)
ALP SERPL-CCNC: 90 U/L (ref 45–117)
ALT SERPL-CCNC: 25 U/L (ref 12–78)
ALT SERPL-CCNC: 36 U/L (ref 12–78)
AMPHET UR QL SCN: POSITIVE
ANION GAP SERPL CALC-SCNC: 4 MMOL/L (ref 2–12)
ANION GAP SERPL CALC-SCNC: 4 MMOL/L (ref 2–12)
APPEARANCE UR: CLEAR
AST SERPL-CCNC: 20 U/L (ref 15–37)
AST SERPL-CCNC: 28 U/L (ref 15–37)
BACTERIA URNS QL MICRO: ABNORMAL /HPF
BARBITURATES UR QL SCN: NEGATIVE
BASOPHILS # BLD: 0.07 K/UL (ref 0–0.1)
BASOPHILS NFR BLD: 0.9 % (ref 0–1)
BENZODIAZ UR QL: NEGATIVE
BILIRUB DIRECT SERPL-MCNC: 0.2 MG/DL (ref 0–0.2)
BILIRUB SERPL-MCNC: 0.4 MG/DL (ref 0.2–1)
BILIRUB SERPL-MCNC: 0.7 MG/DL (ref 0.2–1)
BILIRUB UR QL: NEGATIVE
BUN SERPL-MCNC: 20 MG/DL (ref 6–20)
BUN SERPL-MCNC: 27 MG/DL (ref 6–20)
BUN/CREAT SERPL: 23 (ref 12–20)
BUN/CREAT SERPL: 25 (ref 12–20)
CALCIUM SERPL-MCNC: 8.7 MG/DL (ref 8.5–10.1)
CALCIUM SERPL-MCNC: 9 MG/DL (ref 8.5–10.1)
CANNABINOIDS UR QL SCN: NEGATIVE
CHLORIDE SERPL-SCNC: 104 MMOL/L (ref 97–108)
CHLORIDE SERPL-SCNC: 105 MMOL/L (ref 97–108)
CO2 SERPL-SCNC: 29 MMOL/L (ref 21–32)
CO2 SERPL-SCNC: 31 MMOL/L (ref 21–32)
COCAINE UR QL SCN: POSITIVE
COLOR UR: ABNORMAL
COMMENT:: NORMAL
COMMENT:: NORMAL
CREAT SERPL-MCNC: 0.86 MG/DL (ref 0.7–1.3)
CREAT SERPL-MCNC: 1.08 MG/DL (ref 0.7–1.3)
DIFFERENTIAL METHOD BLD: ABNORMAL
EOSINOPHIL # BLD: 0.46 K/UL (ref 0–0.4)
EOSINOPHIL NFR BLD: 5.8 % (ref 0–7)
EPITH CASTS URNS QL MICRO: ABNORMAL /LPF
ERYTHROCYTE [DISTWIDTH] IN BLOOD BY AUTOMATED COUNT: 15.9 % (ref 11.5–14.5)
ETHANOL SERPL-MCNC: <10 MG/DL (ref 0–0.08)
GLOBULIN SER CALC-MCNC: 2.9 G/DL (ref 2–4)
GLOBULIN SER CALC-MCNC: 2.9 G/DL (ref 2–4)
GLUCOSE SERPL-MCNC: 82 MG/DL (ref 65–100)
GLUCOSE SERPL-MCNC: 99 MG/DL (ref 65–100)
GLUCOSE UR STRIP.AUTO-MCNC: NEGATIVE MG/DL
HCT VFR BLD AUTO: 47.2 % (ref 36.6–50.3)
HGB BLD-MCNC: 14.9 G/DL (ref 12.1–17)
HGB UR QL STRIP: NEGATIVE
HYALINE CASTS URNS QL MICRO: ABNORMAL /LPF (ref 0–5)
IMM GRANULOCYTES # BLD AUTO: 0.02 K/UL (ref 0–0.04)
IMM GRANULOCYTES NFR BLD AUTO: 0.3 % (ref 0–0.5)
KETONES UR QL STRIP.AUTO: 15 MG/DL
LEUKOCYTE ESTERASE UR QL STRIP.AUTO: NEGATIVE
LYMPHOCYTES # BLD: 1.92 K/UL (ref 0.8–3.5)
LYMPHOCYTES NFR BLD: 24.3 % (ref 12–49)
Lab: ABNORMAL
MAGNESIUM SERPL-MCNC: 2.2 MG/DL (ref 1.6–2.4)
MCH RBC QN AUTO: 28.8 PG (ref 26–34)
MCHC RBC AUTO-ENTMCNC: 31.6 G/DL (ref 30–36.5)
MCV RBC AUTO: 91.3 FL (ref 80–99)
METHADONE UR QL: NEGATIVE
MONOCYTES # BLD: 1.11 K/UL (ref 0–1)
MONOCYTES NFR BLD: 14.1 % (ref 5–13)
NEUTS SEG # BLD: 4.32 K/UL (ref 1.8–8)
NEUTS SEG NFR BLD: 54.6 % (ref 32–75)
NITRITE UR QL STRIP.AUTO: NEGATIVE
NRBC # BLD: 0 K/UL (ref 0–0.01)
NRBC BLD-RTO: 0 PER 100 WBC
OPIATES UR QL: POSITIVE
PCP UR QL: NEGATIVE
PH UR STRIP: 5.5 (ref 5–8)
PHOSPHATE SERPL-MCNC: 3.3 MG/DL (ref 2.6–4.7)
PLATELET # BLD AUTO: 277 K/UL (ref 150–400)
PMV BLD AUTO: 11.5 FL (ref 8.9–12.9)
POTASSIUM SERPL-SCNC: 3.4 MMOL/L (ref 3.5–5.1)
POTASSIUM SERPL-SCNC: 3.5 MMOL/L (ref 3.5–5.1)
PROT SERPL-MCNC: 6.3 G/DL (ref 6.4–8.2)
PROT SERPL-MCNC: 6.3 G/DL (ref 6.4–8.2)
PROT UR STRIP-MCNC: ABNORMAL MG/DL
RBC # BLD AUTO: 5.17 M/UL (ref 4.1–5.7)
RBC #/AREA URNS HPF: ABNORMAL /HPF (ref 0–5)
SODIUM SERPL-SCNC: 138 MMOL/L (ref 136–145)
SODIUM SERPL-SCNC: 139 MMOL/L (ref 136–145)
SP GR UR REFRACTOMETRY: >1.03
SPECIMEN HOLD: NORMAL
SPECIMEN HOLD: NORMAL
UROBILINOGEN UR QL STRIP.AUTO: 1 EU/DL (ref 0.2–1)
WBC # BLD AUTO: 7.9 K/UL (ref 4.1–11.1)
WBC URNS QL MICRO: ABNORMAL /HPF (ref 0–4)

## 2025-03-29 PROCEDURE — 85025 COMPLETE CBC W/AUTO DIFF WBC: CPT

## 2025-03-29 PROCEDURE — 99283 EMERGENCY DEPT VISIT LOW MDM: CPT

## 2025-03-29 PROCEDURE — 90791 PSYCH DIAGNOSTIC EVALUATION: CPT

## 2025-03-29 PROCEDURE — 81001 URINALYSIS AUTO W/SCOPE: CPT

## 2025-03-29 PROCEDURE — 80307 DRUG TEST PRSMV CHEM ANLYZR: CPT

## 2025-03-29 PROCEDURE — 82077 ASSAY SPEC XCP UR&BREATH IA: CPT

## 2025-03-29 PROCEDURE — 90792 PSYCH DIAG EVAL W/MED SRVCS: CPT

## 2025-03-29 PROCEDURE — 80053 COMPREHEN METABOLIC PANEL: CPT

## 2025-03-29 SDOH — ECONOMIC STABILITY - HOUSING INSECURITY: HOMELESSNESS UNSPECIFIED: Z59.00

## 2025-03-29 ASSESSMENT — PAIN SCALES - GENERAL
PAINLEVEL_OUTOF10: 0

## 2025-03-29 ASSESSMENT — SLEEP AND FATIGUE QUESTIONNAIRES
DO YOU USE A SLEEP AID: NO
DO YOU HAVE DIFFICULTY SLEEPING: NO
AVERAGE NUMBER OF SLEEP HOURS: 8

## 2025-03-29 ASSESSMENT — PAIN - FUNCTIONAL ASSESSMENT: PAIN_FUNCTIONAL_ASSESSMENT: 0-10

## 2025-03-29 NOTE — VIRTUAL HEALTH
[place] Consult to Tele-Psych  Consult performed by: Terrie Harrison, APRN - CNP  Consult ordered by: Apolinar Chandler MD  Reason for consult: Other: Auditory ad visual hallucinations, paranoia      Jose Higuera  [id number]  1985     EMERGENCY DEPARTMENT TELEPSYCHIATRY EVALUATION    03/29/25    Chief Complaint:  “Anxiety”    HPI: Patient is a 39 y.o.  male who presents for psychiatric evaluation. Patient presented to the ED on 03/29/25. History from the ED: per ED Triage note: \"Pt ambulatory to triage co detoxing from alcohol, increased anxiety. Pt denies SI/HI. Last drink was yesterday morning. Pt drink about a 6 pack or beer a day. Denies previous hx of withdrawal. Pt admits to [facility] for \"a while.\" Pt admits to feeling paranoid. Pt avoiding eye contact in triage at this time. Pt seen here yesterday for same thing.\" ETOH level negative. UDS +amphetamine, cocaine, opiates. Upon assessment today patient is alert, oriented, and agreeable to participate in assessment. Patient is seated in bed and calm. Patient presents irritable, evasive, and guarded during interview. Patient states he came to the ED due to anxiety. States anxiety has been ongoing “for quite a while” and worsening a few days ago. States it comes and goes. Patient also reports ongoing depression the past few years. Endorses anhedonia, increased sleep, and feelings of hopelessness. Patient denies passive or active SI/plan/intent currently. Denies current HI and AVH as well. No paranoia or delusions verbalized. Patient states he is not currently taking any psychiatric medications or established with any outpatient psychiatric services. Patient reports recent substance use. States he drinks alcohol daily, “a 6 pack.” Patient also reports using other substances 3 days ago but is unwilling to discuss his recent substance use. Patient states he is not interested in pursing any alcohol rehab or substance use treatment at this time.  access to guns, Patient has social or family support, and Physically healthy  Risk Factors:  Risk Factors: Male gender, Depressed mood, Active substance abuse, and No outpatient services in place    C-SSRS Score       3/29/2025     5:57 AM   C-SSRS Suicide Screening   1) Within the past month, have you wished you were dead or wished you could go to sleep and not wake up?  No   2) Have you actually had any thoughts of killing yourself?  No   6) Have you ever done anything, started to do anything, or prepared to do anything to end your life? No   Overall Level Suicide Risk: TelePsych CSSRS Risk Level: Low Risk    Assessment:   Patient is a 39 y.o.  male who presents for psychiatric evaluation. Upon assessment patient is guarded, evasive, and irritable at time. States he came to the ED due to anxiety. Endorses depression ongoing the past few years. Currently denies SI/HI/AVH. No paranoia or delusions verbalized. The patient is not agitated or aggressive and did not demonstrate any signs or symptoms consistent with acute psychosis or darwin. The patient is not taking any psychiatric medications or receiving outpatient psychiatric treatment. Patient states he drinks alcohol daily and is unwilling to discuss other recent drug use. ETOH level negative and UDS +amphetamine, cocaine, opiates. Patient declines alcohol rehab or any substance use treatment but is interested in resuming outpatient psychiatric services. At this time, patient does not meet criteria for inpatient psychiatric admission. Discussed recommendation for follow up, and strict return precautions for suicidal thoughts and new or worsening symptoms.      Dx:   Polysubstance Abuse      Plan:  The patient is cleared to be discharged from a psychiatric point of view, when medically appropriate.  Patient does not meet criteria for a psychiatric hold at this time  Medical co-morbidities: Management per medical providers, appreciate assistance.  Please

## 2025-03-29 NOTE — ED TRIAGE NOTES
Pt ambulatory to triage co detoxing from alcohol, increased anxiety. Pt denies SI/HI. Last drink was yesterday morning. Pt drink about a 6 pack or beer a day. Denies previous hx of withdrawal.     Pt admits to [facility] for \"a while.\" Pt admits to feeling paranoid. Pt avoiding eye contact in triage at this time. Pt seen here yesterday for same thing.

## 2025-03-29 NOTE — BSMART NOTE
Per provider, patient was discharged home with recommendations for outpatient follow-up to address his mental health and alcohol use concerns.

## 2025-03-29 NOTE — ED TRIAGE NOTES
Patient arrives to ED reports wanting alcohol detox.  Patient reports drinking 6 beers per day.  Last drink was this morning.      Patient denies prior withdrawal from alcohol, denies headache, n/v, tremors, or hallucinations.

## 2025-03-29 NOTE — DISCHARGE INSTRUCTIONS
Substance Abuse and Addiction Resources    [facility]  [phone]  Closed meeting- family members that have been affected bysomeone alcohol abuse  Open meeting everyone can attend.    Alcoholic's Anonymous [phone]  Non professional (alcoholics in recovery helping others)  Hold Meetings (talk about sobriety, have desire)  No charge    [facility] [phone]  Femi Rodriguez is the Treatment Consultant in the [facility]  Free one-on-one phone consultation and insurance verification  12 step based meetings and philosophy  Family programs and sessions    [facility] [phone]  Free individual assessment  Intensive outpatient outpatient program  Ambulatory withdrawal management/detoxification  Insurance required    [facility]  [phone] ([place] location), [phone] ([place] location)  An Office Based Opioid Treatment Program  Individual and Group therapy, Peer Recovery Services and Care Coordination  Accepting Medicare, Medicaid and Commercial/private insurance  $200 a month self-pay program (does not include medicine or outside labs)  [address], [address], [facility] [address] (Monday - Friday, 9a-5p.  Standing [facility] ED referral appointment 10:00-11:00 Monday)  [address], [address], [place] [address] (Tuesday - Friday, 9a-5p Standing [facility] ED referral appointment 10:00-11:00 Tuesday - Friday)    [facility] [phone]  Good for patients with no insurance, Medicaid, Medicare  Sliding fee scale available for self pay  Patients go Monday-Friday from 8-4:30 to central intake for a shelter and then to [facility] for services  Offers a variety of services I.e. Laundry, shower, eye clinic, medical clinic, dental, substance abuse services, case management, etc.    [facility] [phone]  Make appointment with counselor  $60 fee for initial hour intake    [facility] [phone]    [facility]  admit.    [facility] [phone]  Accepts MALES between 21-65  Need social security card and picture ID  Must pass breath test and 10 panel Urine Drug Screen  No Sex Offenders or Psychiatric patients  Must not have been a 3x repeat at this facility  6 month in house- has to participate in work therapy 40 hours/week  Participates in spiritual classes, bible study and Christian    [facility] [phone]    [facility] [phone]  Private Pay, sent by Community [facility] Board  No Sex Offenders    Community [facility]ards (by Region)    [facility]  [phone]  Monday through Friday 8:30am to 5:00pm  Brief Telephone Interview. Then will be scheduled for next substance abuse services orientation group and then scheduled for intake interview.    [facility]  [phone]  Walk in Monday through Friday 9:00AM to 3:00PM  Bring Picture ID, Proof of residence (piece of mail), Proof of Insurance (Medicaid/sliding scale), Proof of income (any)    [facility] [phone]  Walk in then Assessment by licensed professional   East office ([address] [place] [address]) Monday, Tuesday, Thursday  9AM to 3PM.   West office ([address] [place] [address], [address]) Monday-Thursday 9AM - 3PM.     [facility]  [phone]  Walk In Monday to Friday starting at 8AM  Bring Picture ID, Proof of residence (piece of mail), Proof of insurance (Medicaid/sliding scale)  At 9AM is the Substance Abuse Services Orientation Group and then scheduled for Intake Evaluation.

## 2025-03-29 NOTE — ED NOTES
Verbal shift change report given to Patricia RN (oncoming nurse) by Adrian RN (offgoing nurse). Report included the following information Nurse Handoff Report, Index, ED Encounter Summary, ED SBAR, Adult Overview, MAR, and Recent Results.

## 2025-03-29 NOTE — ED PROVIDER NOTES
[facility] EMERGENCY DEPARTMENT  EMERGENCY DEPARTMENT ENCOUNTER      Pt Name: Jose Higuera  MRN: [id number]  Birthdate 1985  Date of evaluation: 3/28/2025  Provider: Kye Marte MD    CHIEF COMPLAINT       Chief Complaint   Patient presents with    Alcohol Problem       ALLERGIES     Nsaids and Sulfa antibiotics    ENCOUNTER   HISTORY OF PRESENT ILLNESS:  Jose Higuera is a 39-year-old male with a past medical history significant for asthma, hypertension, hyperlipidemia, schizophrenia, cocaine use disorder, and nicotine disorder. He presented to the Emergency Department from home, providing his own history. He reports consuming 6 beers per day, with the last consumption this morning, and is requesting alcohol detoxification. He denies experiencing prior seizures, headache, nausea, vomiting, tremors, or hallucinations. Notably, he had a recent admission for alcohol withdrawal at [facility] from March 22nd to March 24th, where he was treated with the CIWA protocol, benzodiazepines, multivitamins, thiamine, and folic acid, and was discharged with a CIWA score of 0.    PAST MEDICAL HISTORY:  - Asthma  - Hypertension  - Hyperlipidemia  - Schizophrenia  - Cocaine use disorder  - Nicotine disorder  - Recent admission for alcohol withdrawal at [facility] from March 22nd to March 24th    SOCIAL HISTORY:  - Reports alcohol use (6 beers per day)  - History of cocaine use disorder  - Nicotine disorder    RELEVANT SOCIAL DETERMINANTS OF HEALTH:  - Patient is homeless.    PHYSICAL EXAM:  General Appearance: Alert, oriented, and conversant. No acute distress noted.    Skin: No piloerection observed.    Cardiovascular: Regular rate and rhythm, no tachycardia.    Neurological: Alert and oriented. Speech is clear, linear, and logical. No tremors or hallucinations noted.    Musculoskeletal: No evidence of injury.    Respiratory: Normal respiratory effort.    Psychiatric: Cooperative, no

## 2025-03-29 NOTE — VIRTUAL HEALTH
Jose Higuera  [id number]  1985     Social Work Behavioral Health Crisis Assessment    03/29/25    Chief Complaint: \"I don't feel good\"     HPI: Patient is a 39 y.o. White (non-) male who presented to the ED via family member on 3/28/25 for detox. Per triage note, Patient arrives to ED reports wanting alcohol detox. Patient reports drinking 6 beers per day. Last drink was this morning. Per chart review, patient has a hx of schizophrenia, ADHD, alcohol withdrawal delirium and suicidal ideation. He was evaluated in the ED on 3/24 for similar complaints and left AMA.     LISW met with patient and completed evaluation via [employer]. Patient was initially pleasant and open to conversation, however became guarded and irritable when LISW started to ask about his alcohol use and mental health history. Patient stated that he presented to the ED because he does not feel good. When LISW asked patient if he was interested in alcohol detox, he stated \"I've should've just kept my mouth shut\". LISW provided active listening and offered resources, patient declined. He denied drug/alcohol use. However, UDS was positive for amphetamine, cocaine and opiates. Patient denied SI/HI/hallucinations. He stated that he is not taking any mental health medications and is not interested in mental health treatment at this time. Patient reported good sleep and appetite. He stated that he lives with family and does not need any substance abuse or mental health resources. Patient briefly participated in safety planning.     Past Psychiatric History:  Previous Diagnoses/symptoms: Schizophrenia, ADHD, alcohol withdrawal delirium  Previous suicide attempts/self-harm: Denies  Inpatient psychiatric hospitalizations: yes  Current outpatient psychiatric provider: Denies  Current therapist: States not in therapy  Previous psychiatric medication trials: unknown  Current psychiatric medications: No current psychiatric medications  Family  Psychiatric History: unknown    Sleep Hours: 8    Sleep concerns: denies    Use of sleep medications: denies    Substance Abuse History:  Tobacco: Endorses cigarette use  Alcohol: Patient denies, however per chart review patient stated that he drinks 6 beers a day and is interested in detox.   Marijuana: Denies  Stimulant: Denies, however UDS is positive for Cocaine and Amphetamine  Opiates: Denies, however UDS is positive  Benzodiazepine: Denies  Other illicit drug usage: Denies  History of substance/alcohol abuse treatment: Unknown    Social History:  Education: unknown  Living Situation/Interest: with family  Marital/Committed relationship and parenting hx: single, has a daughter  Occupation: unable to assess  Legal History/Hx of Violence: unable to assess  Spiritual History: unable to assess  Psychological trauma, neglect, or abuse: unable to assess  Access to guns or other weapons: denies    Past Medical History:  Active Ambulatory Problems     Diagnosis Date Noted    Alcohol withdrawal delirium (HCC) 03/22/2025     Resolved Ambulatory Problems     Diagnosis Date Noted    No Resolved Ambulatory Problems     Past Medical History:   Diagnosis Date    Asthma     Chronic kidney disease     Hypercholesterolemia     Hypertension      Allergies:  Allergies   Allergen Reactions    Nsaids Other (See Comments)     Pt states he is not supposed to have due to kidney injury    Sulfa Antibiotics Rash      Medications:  No current facility-administered medications for this encounter.    Current Outpatient Medications:     nicotine (NICODERM CQ) 21 MG/24HR, Place 1 patch onto the skin daily, Disp: 30 patch, Rfl: 0    folic acid (FOLVITE) 1 MG tablet, Take 1 tablet by mouth daily, Disp: 30 tablet, Rfl: 0    Multiple Vitamin (MULTIVITAMIN) TABS tablet, Take 1 tablet by mouth daily, Disp: 30 tablet, Rfl: 0    thiamine 100 MG tablet, Take 1 tablet by mouth daily, Disp: 30 tablet, Rfl: 0    traZODone (DESYREL) 50 MG tablet, , Disp:

## 2025-03-29 NOTE — ED PROVIDER NOTES
HISTORY OF PRESENT ILLNESS    A male patient with a past medical history significant for hypertension and a brief psychiatric admission presents to the Emergency Department seeking help for alcohol and mental health issues. The history is provided by the patient himself. He reports experiencing auditory and visual hallucinations but denies any suicidal or homicidal ideation. The patient has a history of heavy alcohol use, consuming approximately 10 fireballs, a pint of whiskey, or a 6-pack daily. He also smokes about half a pack of cigarettes per day and occasionally uses methamphetamine and cocaine, with the last use of methamphetamine a few days ago and cocaine over a week ago. The patient is not currently on any medications but has previously taken medication for anxiety. He denies any allergies and has agreed to lab work to ensure everything is okay.    PAST MEDICAL HISTORY    - Hypertension    - Psychiatric History: Brief psychiatric admission in the past    SOCIAL HISTORY    - Drinks heavily, approximately 10 fireballs, a pint of whiskey, or a 6-pack daily    - Smokes approximately half a pack per day    - Occasional use of methamphetamine (last used a few days ago) and cocaine (last used over a week ago)    PHYSICAL EXAM    Vitals: Interpreted as normal for this patient.    General: NAD. Well-kept male adult.    Eyes: Appear normal with no scleral icterus.    HENT: Atraumatic. Moist mucous membranes, no pharyngeal erythema, edema or lesions.    Neck: Atraumatic, supple.    Cardiac: Regular rate, regular rhythm, no significant murmurs appreciated.    Respiratory: No respiratory distress, clear lungs bilaterally with no abnormal breath sounds.    Abdomen: Soft. Nontender. Nondistended. No rebound. No guarding. No tenderness over McBurney's point, no tenderness over the liver or spleen, no Leyva's sign, no pulsatile abdominal mass.    : No CVAT.    MS: Extremities atraumatic. No edema, no calf tenderness to  signs of concern develop, other etiologies or diagnoses may need to be considered, requiring other tests, treatments, consultations, and/or admission. The diagnosis, plan, expected course, follow-up, and return precautions were discussed, and all questions were answered.    DIAGNOSIS:  Primary Diagnosis:  - Alcohol abuse     Apolinar Chandler MD  03/29/25 0956

## 2025-03-29 NOTE — VIRTUAL HEALTH
Received request for Telepsychiatry evaluation.    Ethanol level is needed prior to telepsych evaluation. Telepsych team will check back for lab results. ECHO notified Dr. Chandler via [employer].     --ECHO Belcher on 3/29/2025 at 6:50 AM    An electronic signature was used to authenticate this note.